# Patient Record
Sex: FEMALE | URBAN - METROPOLITAN AREA
[De-identification: names, ages, dates, MRNs, and addresses within clinical notes are randomized per-mention and may not be internally consistent; named-entity substitution may affect disease eponyms.]

---

## 2019-09-23 ENCOUNTER — APPOINTMENT (RX ONLY)
Dept: URBAN - METROPOLITAN AREA CLINIC 86 | Facility: CLINIC | Age: 64
Setting detail: DERMATOLOGY
End: 2019-09-23

## 2019-09-23 DIAGNOSIS — L82.1 OTHER SEBORRHEIC KERATOSIS: ICD-10-CM

## 2019-09-23 DIAGNOSIS — L82.0 INFLAMED SEBORRHEIC KERATOSIS: ICD-10-CM

## 2019-09-23 DIAGNOSIS — L30.4 ERYTHEMA INTERTRIGO: ICD-10-CM

## 2019-09-23 PROBLEM — M12.9 ARTHROPATHY, UNSPECIFIED: Status: ACTIVE | Noted: 2019-09-23

## 2019-09-23 PROBLEM — I10 ESSENTIAL (PRIMARY) HYPERTENSION: Status: ACTIVE | Noted: 2019-09-23

## 2019-09-23 PROCEDURE — ? LIQUID NITROGEN

## 2019-09-23 PROCEDURE — ? COUNSELING

## 2019-09-23 PROCEDURE — ? PRESCRIPTION

## 2019-09-23 PROCEDURE — 99202 OFFICE O/P NEW SF 15 MIN: CPT | Mod: 25

## 2019-09-23 PROCEDURE — ? TREATMENT REGIMEN

## 2019-09-23 PROCEDURE — ? ORDER TESTS

## 2019-09-23 RX ORDER — DESONIDE 0.5 MG/G
OINTMENT TOPICAL
Qty: 1 | Refills: 0 | Status: ERX | COMMUNITY
Start: 2019-09-23

## 2019-09-23 RX ORDER — MUPIROCIN 20 MG/G
OINTMENT TOPICAL BID
Qty: 1 | Refills: 0 | Status: ERX | COMMUNITY
Start: 2019-09-23

## 2019-09-23 RX ADMIN — DESONIDE: 0.5 OINTMENT TOPICAL at 19:30

## 2019-09-23 RX ADMIN — MUPIROCIN: 20 OINTMENT TOPICAL at 19:30

## 2019-09-23 ASSESSMENT — LOCATION DETAILED DESCRIPTION DERM
LOCATION DETAILED: PERIUMBILICAL SKIN
LOCATION DETAILED: RIGHT INFERIOR LATERAL MALAR CHEEK

## 2019-09-23 ASSESSMENT — SEVERITY ASSESSMENT: SEVERITY: MILD TO MODERATE

## 2019-09-23 ASSESSMENT — BSA RASH: BSA RASH: 2

## 2019-09-23 ASSESSMENT — LOCATION ZONE DERM
LOCATION ZONE: TRUNK
LOCATION ZONE: FACE

## 2019-09-23 ASSESSMENT — LOCATION SIMPLE DESCRIPTION DERM
LOCATION SIMPLE: ABDOMEN
LOCATION SIMPLE: RIGHT CHEEK

## 2019-09-23 ASSESSMENT — PAIN INTENSITY VAS: HOW INTENSE IS YOUR PAIN 0 BEING NO PAIN, 10 BEING THE MOST SEVERE PAIN POSSIBLE?: NO PAIN

## 2019-09-23 NOTE — PROCEDURE: MIPS QUALITY
Quality 131: Pain Assessment And Follow-Up: Pain assessment documented as positive using a standardized tool AND a follow-up plan is documented
Quality 130: Documentation Of Current Medications In The Medical Record: Current Medications Documented
Quality 402: Tobacco Use And Help With Quitting Among Adolescents: Patient screened for tobacco and is an ex-smoker
Quality 110: Preventive Care And Screening: Influenza Immunization: Influenza Immunization not Administered for Documented Reasons.
Quality 474: Zoster Vaccination Status: Shingrix Vaccination Administered or Previously Received
Detail Level: Detailed
Quality 111:Pneumonia Vaccination Status For Older Adults: Pneumococcal Vaccination Previously Received

## 2019-09-23 NOTE — HPI: SCAR
Is This A New Presentation, Or A Follow-Up?: Scar
How Severe Is Your Scar?: mild
Additional History: Wats Refill of zinc cream

## 2019-09-23 NOTE — PROCEDURE: ORDER TESTS
Performing Laboratory: [id number]
Expected Date Of Service: 09/23/2019
Bill For Surgical Tray: no
Billing Type: Third-Party Bill

## 2019-09-23 NOTE — PROCEDURE: LIQUID NITROGEN
Medical Necessity Clause: This procedure was medically necessary because the lesions that were treated were:
Number Of Freeze-Thaw Cycles: 1 freeze-thaw cycle
Render Note In Bullet Format When Appropriate: No
Post-Care Instructions: I reviewed with the patient in detail post-care instructions. Patient is to wear sunprotection, and avoid picking at any of the treated lesions. Pt may apply Vaseline to crusted or scabbing areas.
Medical Necessity Information: It is in your best interest to select a reason for this procedure from the list below. All of these items fulfill various CMS LCD requirements except the new and changing color options.
Consent: The patient's consent was obtained including but not limited to risks of crusting, scabbing, blistering, scarring, darker or lighter pigmentary change, recurrence, incomplete removal and infection.
Detail Level: Detailed
Aperture Size (Optional): D

## 2019-09-23 NOTE — PROCEDURE: TREATMENT REGIMEN
Detail Level: Zone
Plan: Discussed otc triple paste\\nAlso discussed zinc oxide will decide at follow up appointment
Initiate Treatment: Desonide ointment apply qd for 1 week\\nApply mupirocin ointment daily

## 2024-12-18 ENCOUNTER — INPATIENT (INPATIENT)
Facility: HOSPITAL | Age: 69
LOS: 6 days | Discharge: HOME | DRG: 280 | End: 2024-12-25
Attending: INTERNAL MEDICINE | Admitting: INTERNAL MEDICINE
Payer: COMMERCIAL

## 2024-12-18 VITALS
RESPIRATION RATE: 26 BRPM | DIASTOLIC BLOOD PRESSURE: 67 MMHG | HEART RATE: 134 BPM | TEMPERATURE: 97 F | SYSTOLIC BLOOD PRESSURE: 89 MMHG | WEIGHT: 199.96 LBS | OXYGEN SATURATION: 94 %

## 2024-12-18 DIAGNOSIS — E87.70 FLUID OVERLOAD, UNSPECIFIED: ICD-10-CM

## 2024-12-18 DIAGNOSIS — G25.81 RESTLESS LEGS SYNDROME: ICD-10-CM

## 2024-12-18 DIAGNOSIS — E03.9 HYPOTHYROIDISM, UNSPECIFIED: ICD-10-CM

## 2024-12-18 DIAGNOSIS — I48.91 UNSPECIFIED ATRIAL FIBRILLATION: ICD-10-CM

## 2024-12-18 LAB
ADD ON TEST-SPECIMEN IN LAB: SIGNIFICANT CHANGE UP
ALBUMIN SERPL ELPH-MCNC: 3.9 G/DL — SIGNIFICANT CHANGE UP (ref 3.3–5)
ALBUMIN SERPL ELPH-MCNC: 3.9 G/DL — SIGNIFICANT CHANGE UP (ref 3.3–5)
ALP SERPL-CCNC: 129 U/L — HIGH (ref 40–120)
ALP SERPL-CCNC: SIGNIFICANT CHANGE UP (ref 40–120)
ALT FLD-CCNC: 31 U/L — SIGNIFICANT CHANGE UP (ref 10–45)
ALT FLD-CCNC: SIGNIFICANT CHANGE UP (ref 10–45)
ANION GAP SERPL CALC-SCNC: 12 MMOL/L — SIGNIFICANT CHANGE UP (ref 5–17)
ANION GAP SERPL CALC-SCNC: 12 MMOL/L — SIGNIFICANT CHANGE UP (ref 5–17)
APTT BLD: 29.6 SEC — SIGNIFICANT CHANGE UP (ref 24.5–35.6)
AST SERPL-CCNC: 53 U/L — HIGH (ref 10–40)
AST SERPL-CCNC: SIGNIFICANT CHANGE UP (ref 10–40)
BASOPHILS # BLD AUTO: 0.04 K/UL — SIGNIFICANT CHANGE UP (ref 0–0.2)
BASOPHILS NFR BLD AUTO: 0.4 % — SIGNIFICANT CHANGE UP (ref 0–2)
BILIRUB SERPL-MCNC: 0.4 MG/DL — SIGNIFICANT CHANGE UP (ref 0.2–1.2)
BILIRUB SERPL-MCNC: 0.4 MG/DL — SIGNIFICANT CHANGE UP (ref 0.2–1.2)
BUN SERPL-MCNC: 16 MG/DL — SIGNIFICANT CHANGE UP (ref 7–23)
BUN SERPL-MCNC: 17 MG/DL — SIGNIFICANT CHANGE UP (ref 7–23)
CALCIUM SERPL-MCNC: 9 MG/DL — SIGNIFICANT CHANGE UP (ref 8.4–10.5)
CALCIUM SERPL-MCNC: 9 MG/DL — SIGNIFICANT CHANGE UP (ref 8.4–10.5)
CHLORIDE SERPL-SCNC: 102 MMOL/L — SIGNIFICANT CHANGE UP (ref 96–108)
CHLORIDE SERPL-SCNC: 103 MMOL/L — SIGNIFICANT CHANGE UP (ref 96–108)
CK MB CFR SERPL CALC: 3.3 NG/ML — SIGNIFICANT CHANGE UP (ref 0–6.7)
CK SERPL-CCNC: 95 U/L — SIGNIFICANT CHANGE UP (ref 25–170)
CO2 SERPL-SCNC: 24 MMOL/L — SIGNIFICANT CHANGE UP (ref 22–31)
CO2 SERPL-SCNC: 25 MMOL/L — SIGNIFICANT CHANGE UP (ref 22–31)
CREAT SERPL-MCNC: 0.81 MG/DL — SIGNIFICANT CHANGE UP (ref 0.5–1.3)
CREAT SERPL-MCNC: 0.88 MG/DL — SIGNIFICANT CHANGE UP (ref 0.5–1.3)
EGFR: 71 ML/MIN/1.73M2 — SIGNIFICANT CHANGE UP
EGFR: 78 ML/MIN/1.73M2 — SIGNIFICANT CHANGE UP
EOSINOPHIL # BLD AUTO: 0.2 K/UL — SIGNIFICANT CHANGE UP (ref 0–0.5)
EOSINOPHIL NFR BLD AUTO: 1.9 % — SIGNIFICANT CHANGE UP (ref 0–6)
GLUCOSE SERPL-MCNC: 105 MG/DL — HIGH (ref 70–99)
GLUCOSE SERPL-MCNC: 106 MG/DL — HIGH (ref 70–99)
HCT VFR BLD CALC: 39.1 % — SIGNIFICANT CHANGE UP (ref 34.5–45)
HGB BLD-MCNC: 12.5 G/DL — SIGNIFICANT CHANGE UP (ref 11.5–15.5)
IMM GRANULOCYTES NFR BLD AUTO: 0.2 % — SIGNIFICANT CHANGE UP (ref 0–0.9)
INR BLD: 0.96 — SIGNIFICANT CHANGE UP (ref 0.85–1.16)
LACTATE SERPL-SCNC: 1.9 MMOL/L — SIGNIFICANT CHANGE UP (ref 0.5–2)
LYMPHOCYTES # BLD AUTO: 1.66 K/UL — SIGNIFICANT CHANGE UP (ref 1–3.3)
LYMPHOCYTES # BLD AUTO: 16 % — SIGNIFICANT CHANGE UP (ref 13–44)
MAGNESIUM SERPL-MCNC: 1.8 MG/DL — SIGNIFICANT CHANGE UP (ref 1.6–2.6)
MCHC RBC-ENTMCNC: 29.5 PG — SIGNIFICANT CHANGE UP (ref 27–34)
MCHC RBC-ENTMCNC: 32 G/DL — SIGNIFICANT CHANGE UP (ref 32–36)
MCV RBC AUTO: 92.2 FL — SIGNIFICANT CHANGE UP (ref 80–100)
MONOCYTES # BLD AUTO: 0.61 K/UL — SIGNIFICANT CHANGE UP (ref 0–0.9)
MONOCYTES NFR BLD AUTO: 5.9 % — SIGNIFICANT CHANGE UP (ref 2–14)
NEUTROPHILS # BLD AUTO: 7.86 K/UL — HIGH (ref 1.8–7.4)
NEUTROPHILS NFR BLD AUTO: 75.6 % — SIGNIFICANT CHANGE UP (ref 43–77)
NRBC # BLD: 0 /100 WBCS — SIGNIFICANT CHANGE UP (ref 0–0)
NT-PROBNP SERPL-SCNC: 1614 PG/ML — HIGH (ref 0–300)
PHOSPHATE SERPL-MCNC: 3.4 MG/DL — SIGNIFICANT CHANGE UP (ref 2.5–4.5)
PLATELET # BLD AUTO: 279 K/UL — SIGNIFICANT CHANGE UP (ref 150–400)
POTASSIUM SERPL-MCNC: 4.3 MMOL/L — SIGNIFICANT CHANGE UP (ref 3.5–5.3)
POTASSIUM SERPL-MCNC: SIGNIFICANT CHANGE UP (ref 3.5–5.3)
POTASSIUM SERPL-SCNC: 4.3 MMOL/L — SIGNIFICANT CHANGE UP (ref 3.5–5.3)
POTASSIUM SERPL-SCNC: SIGNIFICANT CHANGE UP (ref 3.5–5.3)
PROT SERPL-MCNC: 6.6 G/DL — SIGNIFICANT CHANGE UP (ref 6–8.3)
PROT SERPL-MCNC: 7 G/DL — SIGNIFICANT CHANGE UP (ref 6–8.3)
PROTHROM AB SERPL-ACNC: 11.2 SEC — SIGNIFICANT CHANGE UP (ref 9.9–13.4)
RBC # BLD: 4.24 M/UL — SIGNIFICANT CHANGE UP (ref 3.8–5.2)
RBC # FLD: 14.7 % — HIGH (ref 10.3–14.5)
SODIUM SERPL-SCNC: 138 MMOL/L — SIGNIFICANT CHANGE UP (ref 135–145)
SODIUM SERPL-SCNC: 140 MMOL/L — SIGNIFICANT CHANGE UP (ref 135–145)
TROPONIN T, HIGH SENSITIVITY RESULT: 210 NG/L — CRITICAL HIGH (ref 0–51)
TROPONIN T, HIGH SENSITIVITY RESULT: 80 NG/L — CRITICAL HIGH (ref 0–51)
WBC # BLD: 10.39 K/UL — SIGNIFICANT CHANGE UP (ref 3.8–10.5)
WBC # FLD AUTO: 10.39 K/UL — SIGNIFICANT CHANGE UP (ref 3.8–10.5)

## 2024-12-18 PROCEDURE — 92960 CARDIOVERSION ELECTRIC EXT: CPT

## 2024-12-18 PROCEDURE — 71045 X-RAY EXAM CHEST 1 VIEW: CPT | Mod: 26

## 2024-12-18 PROCEDURE — 99285 EMERGENCY DEPT VISIT HI MDM: CPT

## 2024-12-18 PROCEDURE — 99223 1ST HOSP IP/OBS HIGH 75: CPT

## 2024-12-18 RX ORDER — HEPARIN SODIUM 1000 [USP'U]/ML
9500 INJECTION, SOLUTION INTRAVENOUS; SUBCUTANEOUS EVERY 6 HOURS
Refills: 0 | Status: DISCONTINUED | OUTPATIENT
Start: 2024-12-18 | End: 2024-12-19

## 2024-12-18 RX ORDER — FUROSEMIDE 20 MG
40 TABLET ORAL ONCE
Refills: 0 | Status: COMPLETED | OUTPATIENT
Start: 2024-12-18 | End: 2024-12-18

## 2024-12-18 RX ORDER — ZOLPIDEM TARTRATE 5 MG
1 TABLET ORAL
Refills: 0 | DISCHARGE

## 2024-12-18 RX ORDER — HEPARIN SODIUM 1000 [USP'U]/ML
3500 INJECTION, SOLUTION INTRAVENOUS; SUBCUTANEOUS EVERY 6 HOURS
Refills: 0 | Status: DISCONTINUED | OUTPATIENT
Start: 2024-12-18 | End: 2024-12-18

## 2024-12-18 RX ORDER — ROPINIROLE HYDROCHLORIDE 1 MG/1
2 TABLET, FILM COATED ORAL
Refills: 0 | DISCHARGE

## 2024-12-18 RX ORDER — LEVOTHYROXINE SODIUM 175 UG/1
1 TABLET ORAL
Refills: 0 | DISCHARGE

## 2024-12-18 RX ORDER — CLONAZEPAM 2 MG
1 TABLET ORAL
Refills: 0 | DISCHARGE

## 2024-12-18 RX ORDER — OXYCODONE AND ACETAMINOPHEN 5; 325 MG/1; MG/1
1 TABLET ORAL
Refills: 0 | DISCHARGE

## 2024-12-18 RX ORDER — METOPROLOL TARTRATE 50 MG
5 TABLET ORAL ONCE
Refills: 0 | Status: COMPLETED | OUTPATIENT
Start: 2024-12-18 | End: 2024-12-18

## 2024-12-18 RX ORDER — ASPIRIN 81 MG
81 TABLET, DELAYED RELEASE (ENTERIC COATED) ORAL DAILY
Refills: 0 | Status: DISCONTINUED | OUTPATIENT
Start: 2024-12-18 | End: 2024-12-25

## 2024-12-18 RX ORDER — ZOLPIDEM TARTRATE 5 MG
5 TABLET ORAL AT BEDTIME
Refills: 0 | Status: DISCONTINUED | OUTPATIENT
Start: 2024-12-18 | End: 2024-12-25

## 2024-12-18 RX ORDER — HEPARIN SODIUM 1000 [USP'U]/ML
7500 INJECTION, SOLUTION INTRAVENOUS; SUBCUTANEOUS EVERY 6 HOURS
Refills: 0 | Status: DISCONTINUED | OUTPATIENT
Start: 2024-12-18 | End: 2024-12-18

## 2024-12-18 RX ORDER — FUROSEMIDE 20 MG
80 TABLET ORAL
Refills: 0 | Status: DISCONTINUED | OUTPATIENT
Start: 2024-12-19 | End: 2024-12-22

## 2024-12-18 RX ORDER — LEVOTHYROXINE SODIUM 175 UG/1
75 TABLET ORAL DAILY
Refills: 0 | Status: DISCONTINUED | OUTPATIENT
Start: 2024-12-19 | End: 2024-12-25

## 2024-12-18 RX ORDER — HEPARIN SODIUM 1000 [USP'U]/ML
INJECTION, SOLUTION INTRAVENOUS; SUBCUTANEOUS
Qty: 25000 | Refills: 0 | Status: DISCONTINUED | OUTPATIENT
Start: 2024-12-18 | End: 2024-12-19

## 2024-12-18 RX ORDER — HEPARIN SODIUM 1000 [USP'U]/ML
4500 INJECTION, SOLUTION INTRAVENOUS; SUBCUTANEOUS EVERY 6 HOURS
Refills: 0 | Status: DISCONTINUED | OUTPATIENT
Start: 2024-12-18 | End: 2024-12-19

## 2024-12-18 RX ORDER — ROPINIROLE HYDROCHLORIDE 1 MG/1
6 TABLET, FILM COATED ORAL DAILY
Refills: 0 | Status: DISCONTINUED | OUTPATIENT
Start: 2024-12-18 | End: 2024-12-25

## 2024-12-18 RX ORDER — HEPARIN SODIUM 1000 [USP'U]/ML
INJECTION, SOLUTION INTRAVENOUS; SUBCUTANEOUS
Qty: 25000 | Refills: 0 | Status: DISCONTINUED | OUTPATIENT
Start: 2024-12-18 | End: 2024-12-18

## 2024-12-18 RX ADMIN — Medication 40 MILLIGRAM(S): at 20:34

## 2024-12-18 RX ADMIN — Medication 5 MILLIGRAM(S): at 19:44

## 2024-12-18 RX ADMIN — HEPARIN SODIUM 2100 UNIT(S)/HR: 1000 INJECTION, SOLUTION INTRAVENOUS; SUBCUTANEOUS at 23:34

## 2024-12-18 NOTE — H&P ADULT - NSICDXPASTMEDICALHX_GEN_ALL_CORE_FT
PAST MEDICAL HISTORY:  Asthma     HLD (hyperlipidemia)     HTN (hypertension)     Hypothyroidism     Restless leg     Transient ischemic attack (TIA)

## 2024-12-18 NOTE — ED PROVIDER NOTE - PHYSICAL EXAMINATION
Gen - Nontoxic, speaking sentences, alert and oriented x 4  HEENT - NCAT, EOMI, moist mucous membranes, clear oropharynx, +nasal cannula  Neck - supple, no JVD  Resp - bibasilar crackles, no increased WOB but mildly tachypneic  CV -  tachycardic, irregular, no m/r/g  Abd - elevated BMI, soft, NT, ND; no guarding or rebound  MSK - FROM of b/l UE and LE, no gross deformities  Extrem - b/l 2+ pitting edema, no erythema/warmth/tenderness  Neuro - no focal motor or sensation deficits  Skin - cool to touch in distal LEs but warm to touch centrally and in UEs

## 2024-12-18 NOTE — ED PROVIDER NOTE - PROGRESS NOTE DETAILS
[place] MD: Patient reassessed, resting comfortably, HDS. Given 5mg IV lopressor with improvement in HR to 90s. Remains on 2L NC. Will give IV lasix 40mg dose and monitor for urine output. To be admitted to [facility]. [place] MD: Patient reassessed, resting comfortably, HDS. Given 5mg IV lopressor with improvement in HR to 90s. Remains on 2L NC. Will give IV lasix 40mg dose and monitor for urine output. Initial trop somewhat elevated, likely due to demand ischemia, no active chest pain or acute ST segment changes on EKG. Patient signed out to cards fellow/PA.

## 2024-12-18 NOTE — H&P ADULT - PROBLEM SELECTOR PLAN 1
p/w TANNER 5 steps, increasing 2-3+ pitting LE edema x1mo  - BNP 1600, trop 80 -> 141 -> 210  - CXR with vascular congestion  - s/p lasix 40mg IVP x1 in ER  - start lasix 80mg IVP BID  - echo

## 2024-12-18 NOTE — H&P ADULT - ASSESSMENT
68yo obese F ++FHx CAD (mom), PMHx HTN, HLD, TIA x2 (~2019, no residual deficits), hypothyroidism, OA (b/l knee replacement), asthma, anxiety presented to [facility] ED c/o worsening TANNER and b/l LE edema x1mo. States she can only go 5 steps without losing her breath. Denies hx CHF or afib. Pt is visiting from [place] with her . Denies chest pain, palpitations, dizziness, LOC, N/V/D, fever/chills/sick contact, diaphoresis, orthopnea/PND.    In ER, -130s; BP trending 100s/60s; afebrile. Placed on 2LNC for comfort. EKG showing afib RVR rates 120s. CXR w/ vascular congestion. Labs s/f Alk Phos 129, AST 53, trop 80, BNP 1600. S/p lasix 40mg IVP x1, lopressor 5mg IVP x1 in ER. Rates improved to 90s s/p lopressor. Admitted to cardiac tele for CHF exacerbation.

## 2024-12-18 NOTE — ED PROVIDER NOTE - OBJECTIVE STATEMENT
69-year-old female with history of hypertension, hyperlipidemia, anxiety, presenting with progressive shortness of breath and bilateral lower extremity swelling x 1 month, acutely worse today. Patient and spouse are currently visiting from [facility].  States that over the past month she has had worsening SOB/TANNER in conjunction with swelling in both legs as well as episodes intermittently of dizziness.  Today while walking became acutely worse and felt like she could not breathe.  Denies chest pain, fever, chills, cough, vomiting.  No known history of congestive heart failure or A-fib.  Found to be tachycardic to 140 on arrival to ED.

## 2024-12-18 NOTE — ED ADULT NURSE NOTE - OBJECTIVE STATEMENT
Pt is a 70yo female presenting to ED c/o shortness of breath. Pt states, "I have felt short of breath for a month but today it became very bad to the point I could no longer walk." Pt noted to have nonpitting edema to BLE. Pt is A&Ox4, breathing even and unlabored speaking in clear full sentences, SpO2 92% on RA, 96% on 2L NC, ambulatory with no assistive devices at baseline, denies lightheadedness, dizziness, h/a, n/v, c/p, f/c.

## 2024-12-18 NOTE — H&P ADULT - PROBLEM SELECTOR PLAN 4
c/w home ropinirole 3mg 2tabs prn restless leg    ##HTN  - endorses taking home anti-HTN med, but uncertain name of med -- f/u with pharmacy in AM (in [place])    F: Oral intake  E: Replete electrolytes as needed for K<4 and Mg<2  N: DASH Diet  DVT PPX: heparin gtt  Dispo: medically active

## 2024-12-18 NOTE — ED PROVIDER NOTE - CLINICAL SUMMARY MEDICAL DECISION MAKING FREE TEXT BOX
69-year-old female with history of hypertension, hyperlipidemia, anxiety, presenting with progressive shortness of breath and bilateral lower extremity swelling x 1 month, acutely worse today. 69-year-old female with history of hypertension, hyperlipidemia, anxiety, presenting with progressive shortness of breath and bilateral lower extremity swelling x 1 month, acutely worse today. Suspect 2/2 to new congestive heart failure. Unclear if driven by undiagnosed new Afib over past month, or if new CHF is driving Afib rvr acutely today. Placed on 2L NC for mild borderline hypoxia to 92% and for comfort. No significant WOB/tachypnea to indicate PPV. Triage BP soft but upon repeat 100s/70s and stable. Legs are cool to touch but she is warm centrally and in b/l arms with good mentation--will send lactate level in addition to cardiac enzymes, basics, and CXR. Will need 5 uris admission for cards eval, TTE, telemonitoring, and IV diuresis.

## 2024-12-18 NOTE — H&P ADULT - HISTORY OF PRESENT ILLNESS
70yo obese F ++FHx CAD (mom), PMHx HTN, HLD, TIA x2 (~2019, no residual deficits), hypothyroidism, OA (b/l knee replacement), asthma, anxiety presented to [facility] ED c/o worsening TANNER and b/l LE edema x1mo. States she can only go 5 steps without losing her breath. Denies hx CHF or afib. Pt is visiting from [place] with her . Denies chest pain, palpitations, dizziness, LOC, N/V/D, fever/chills/sick contact, diaphoresis, orthopnea/PND.    In ER, -130s; BP trending 100s/60s; afebrile. Placed on 2LNC for comfort. EKG showing afib RVR rates 120s. CXR w/ vascular congestion. Labs s/f Alk Phos 129, AST 53, trop 80, BNP 1600. S/p lasix 40mg IVP x1, lopressor 5mg IVP x1 in ER. Rates improved to 90s s/p lopressor. Admitted to cardiac tele for CHF exacerbation.      70yo obese F ++FHx CAD (mom), PMHx HTN, HLD, TIA x2 (~2019, no residual deficits), hypothyroidism, OA (b/l knee replacement), asthma, anxiety presented to [facility] ED c/o worsening TANNER and b/l LE edema x1mo. States she can only go 5 steps without losing her breath. Denies hx CHF or afib. Pt is visiting from [place] with her . Denies chest pain, palpitations, dizziness, LOC, N/V/D, fever/chills/sick contact, diaphoresis, orthopnea/PND.    In ER, -130s; BP trending 100s/60s; afebrile. Placed on 2LNC for comfort. EKG showing afib RVR rates 120s. CXR w/ vascular congestion. Labs s/f Alk Phos 129, AST 53, trop 80, BNP 1600. S/p lasix 40mg IVP x1, lopressor 5mg IVP x1 in ER. Rates improved to 90s s/p lopressor. Admitted to cardiac tele for CHF exacerbation.

## 2024-12-18 NOTE — H&P ADULT - NSICDXFAMILYHX_GEN_ALL_CORE_FT
FAMILY HISTORY:  Father  Still living? Unknown  FHx: stroke, Age at diagnosis: Age Unknown    Mother  Still living? No  FH: CAD (coronary artery disease), Age at diagnosis: Age Unknown

## 2024-12-18 NOTE — ED ADULT NURSE NOTE - NSFALLRISKINTERV_ED_ALL_ED
Assistance OOB with selected safe patient handling equipment if applicable/Assistance with ambulation/Communicate fall risk and risk factors to all staff, patient, and family/Monitor gait and stability/Provide visual cue: yellow wristband, yellow gown, etc/Reinforce activity limits and safety measures with patient and family/Call bell, personal items and telephone in reach/Instruct patient to call for assistance before getting out of bed/chair/stretcher/Non-slip footwear applied when patient is off stretcher/[place] to call system/Physically safe environment - no spills, clutter or unnecessary equipment/Purposeful Proactive Rounding/Room/bathroom lighting operational, light cord in reach

## 2024-12-18 NOTE — ED ADULT TRIAGE NOTE - CHIEF COMPLAINT QUOTE
Patient PMH HTN to the ED c/o sob/brunner, ble edema and generalized weakness x 1 month, worsening today. Tachypneic, tachycardic and hypotensive, denies hx of afib or vte. AAOX4, NAD.

## 2024-12-18 NOTE — H&P ADULT - NSHPLABSRESULTS_GEN_ALL_CORE
12.5   10.39 )-----------( 279      ( 18 Dec 2024 18:59 )             39.1       12-18    140  |  103  |  17  ----------------------------<  105[H]  4.3   |  25  |  0.88    Ca    9.0      18 Dec 2024 19:57  Phos  3.4     12-18  Mg     1.8     12-18    TPro  6.6  /  Alb  3.9  /  TBili  0.4  /  DBili  x   /  AST  53[H]  /  ALT  31  /  AlkPhos  129[H]  12-18      PT/INR - ( 18 Dec 2024 21:25 )   PT: 11.2 sec;   INR: 0.96          PTT - ( 18 Dec 2024 21:25 )  PTT:29.6 sec    CARDIAC MARKERS ( 18 Dec 2024 21:25 )  x     / x     / x     / x     / 3.3 ng/mL        Urinalysis Basic - ( 18 Dec 2024 19:57 )    Color: x / Appearance: x / SG: x / pH: x  Gluc: 105 mg/dL / Ketone: x  / Bili: x / Urobili: x   Blood: x / Protein: x / Nitrite: x   Leuk Esterase: x / RBC: x / WBC x   Sq Epi: x / Non Sq Epi: x / Bacteria: x

## 2024-12-18 NOTE — H&P ADULT - NS ATTEND AMEND GEN_ALL_CORE FT
68yo obese F ++FHx CAD (mom), PMHx HTN, HLD, TIA x2 (~2019, no residual deficits), hypothyroidism, OA (b/l knee replacement), asthma, anxiety presented to [facility] ED c/o worsening TANNER and b/l LE edema x1mo. States she can only go 5 steps without losing her breath. Denies hx CHF or afib. Pt is visiting from [place] with her .    1. AF RVR  New onset AF with RVR. Start anticoagulation, BB, echo in AM, if does not convert, SHERINE/DCCV once euvolemic.    2. Acute HF  Clinical presentation consistent with HF. Lasix IV, echo in AM.    During non face-to-face time, I reviewed relevant portions of the patient’s medical record; including vitals, labs, medications, cardiac studies, remaining additional imaging and consultant recommendations. During face-to-face time, I took a relevant history and examined the patient. I also explained to the patient their diagnoses, and specific cardiopulmonary management plan, which required a high level of medical decision making.  I answered all questions related to the patient's medical conditions.

## 2024-12-18 NOTE — H&P ADULT - PROBLEM SELECTOR PLAN 2
p/w afib rates 120-130s  - s/p lopressor 5mg IVP x1 in ER w/ rates respond to 100s  - holding further BB given CHF exacerbation  - start heparin gtt

## 2024-12-19 DIAGNOSIS — G25.81 RESTLESS LEGS SYNDROME: ICD-10-CM

## 2024-12-19 DIAGNOSIS — E03.9 HYPOTHYROIDISM, UNSPECIFIED: ICD-10-CM

## 2024-12-19 DIAGNOSIS — I50.21 ACUTE SYSTOLIC (CONGESTIVE) HEART FAILURE: ICD-10-CM

## 2024-12-19 DIAGNOSIS — I48.91 UNSPECIFIED ATRIAL FIBRILLATION: ICD-10-CM

## 2024-12-19 LAB
A1C WITH ESTIMATED AVERAGE GLUCOSE RESULT: 5.8 % — HIGH (ref 4–5.6)
ALBUMIN SERPL ELPH-MCNC: 3.7 G/DL — SIGNIFICANT CHANGE UP (ref 3.3–5)
ALP SERPL-CCNC: 129 U/L — HIGH (ref 40–120)
ALT FLD-CCNC: 25 U/L — SIGNIFICANT CHANGE UP (ref 10–45)
ANION GAP SERPL CALC-SCNC: 13 MMOL/L — SIGNIFICANT CHANGE UP (ref 5–17)
APTT BLD: 123.7 SEC — CRITICAL HIGH (ref 24.5–35.6)
APTT BLD: >200 SEC — CRITICAL HIGH (ref 24.5–35.6)
APTT BLD: >200 SEC — CRITICAL HIGH (ref 24.5–35.6)
AST SERPL-CCNC: 36 U/L — SIGNIFICANT CHANGE UP (ref 10–40)
BASOPHILS # BLD AUTO: 0.03 K/UL — SIGNIFICANT CHANGE UP (ref 0–0.2)
BASOPHILS NFR BLD AUTO: 0.4 % — SIGNIFICANT CHANGE UP (ref 0–2)
BILIRUB SERPL-MCNC: 0.6 MG/DL — SIGNIFICANT CHANGE UP (ref 0.2–1.2)
BUN SERPL-MCNC: 21 MG/DL — SIGNIFICANT CHANGE UP (ref 7–23)
CALCIUM SERPL-MCNC: 9.2 MG/DL — SIGNIFICANT CHANGE UP (ref 8.4–10.5)
CHLORIDE SERPL-SCNC: 102 MMOL/L — SIGNIFICANT CHANGE UP (ref 96–108)
CHOLEST SERPL-MCNC: 140 MG/DL — SIGNIFICANT CHANGE UP
CK MB CFR SERPL CALC: 3.6 NG/ML — SIGNIFICANT CHANGE UP (ref 0–6.7)
CK MB CFR SERPL CALC: 3.8 NG/ML — SIGNIFICANT CHANGE UP (ref 0–6.7)
CK SERPL-CCNC: 86 U/L — SIGNIFICANT CHANGE UP (ref 25–170)
CK SERPL-CCNC: 88 U/L — SIGNIFICANT CHANGE UP (ref 25–170)
CO2 SERPL-SCNC: 26 MMOL/L — SIGNIFICANT CHANGE UP (ref 22–31)
CREAT SERPL-MCNC: 1 MG/DL — SIGNIFICANT CHANGE UP (ref 0.5–1.3)
EGFR: 61 ML/MIN/1.73M2 — SIGNIFICANT CHANGE UP
EOSINOPHIL # BLD AUTO: 0.15 K/UL — SIGNIFICANT CHANGE UP (ref 0–0.5)
EOSINOPHIL NFR BLD AUTO: 2 % — SIGNIFICANT CHANGE UP (ref 0–6)
ESTIMATED AVERAGE GLUCOSE: 120 MG/DL — HIGH (ref 68–114)
GLUCOSE SERPL-MCNC: 109 MG/DL — HIGH (ref 70–99)
HCT VFR BLD CALC: 39.6 % — SIGNIFICANT CHANGE UP (ref 34.5–45)
HDLC SERPL-MCNC: 67 MG/DL — SIGNIFICANT CHANGE UP
HGB BLD-MCNC: 12.7 G/DL — SIGNIFICANT CHANGE UP (ref 11.5–15.5)
IMM GRANULOCYTES NFR BLD AUTO: 0.4 % — SIGNIFICANT CHANGE UP (ref 0–0.9)
INR BLD: 1.13 — SIGNIFICANT CHANGE UP (ref 0.85–1.16)
LIPID PNL WITH DIRECT LDL SERPL: 59 MG/DL — SIGNIFICANT CHANGE UP
LYMPHOCYTES # BLD AUTO: 2.6 K/UL — SIGNIFICANT CHANGE UP (ref 1–3.3)
LYMPHOCYTES # BLD AUTO: 34.3 % — SIGNIFICANT CHANGE UP (ref 13–44)
MAGNESIUM SERPL-MCNC: 1.7 MG/DL — SIGNIFICANT CHANGE UP (ref 1.6–2.6)
MCHC RBC-ENTMCNC: 29.8 PG — SIGNIFICANT CHANGE UP (ref 27–34)
MCHC RBC-ENTMCNC: 32.1 G/DL — SIGNIFICANT CHANGE UP (ref 32–36)
MCV RBC AUTO: 93 FL — SIGNIFICANT CHANGE UP (ref 80–100)
MONOCYTES # BLD AUTO: 0.49 K/UL — SIGNIFICANT CHANGE UP (ref 0–0.9)
MONOCYTES NFR BLD AUTO: 6.5 % — SIGNIFICANT CHANGE UP (ref 2–14)
NEUTROPHILS # BLD AUTO: 4.28 K/UL — SIGNIFICANT CHANGE UP (ref 1.8–7.4)
NEUTROPHILS NFR BLD AUTO: 56.4 % — SIGNIFICANT CHANGE UP (ref 43–77)
NON HDL CHOLESTEROL: 73 MG/DL — SIGNIFICANT CHANGE UP
NRBC # BLD: 0 /100 WBCS — SIGNIFICANT CHANGE UP (ref 0–0)
PLATELET # BLD AUTO: 246 K/UL — SIGNIFICANT CHANGE UP (ref 150–400)
POTASSIUM SERPL-MCNC: 3.8 MMOL/L — SIGNIFICANT CHANGE UP (ref 3.5–5.3)
POTASSIUM SERPL-SCNC: 3.8 MMOL/L — SIGNIFICANT CHANGE UP (ref 3.5–5.3)
PROT SERPL-MCNC: 6.6 G/DL — SIGNIFICANT CHANGE UP (ref 6–8.3)
PROTHROM AB SERPL-ACNC: 13.2 SEC — SIGNIFICANT CHANGE UP (ref 9.9–13.4)
RBC # BLD: 4.26 M/UL — SIGNIFICANT CHANGE UP (ref 3.8–5.2)
RBC # FLD: 15 % — HIGH (ref 10.3–14.5)
SODIUM SERPL-SCNC: 141 MMOL/L — SIGNIFICANT CHANGE UP (ref 135–145)
TRIGL SERPL-MCNC: 68 MG/DL — SIGNIFICANT CHANGE UP
TROPONIN T, HIGH SENSITIVITY RESULT: 143 NG/L — CRITICAL HIGH (ref 0–51)
TROPONIN T, HIGH SENSITIVITY RESULT: 212 NG/L — CRITICAL HIGH (ref 0–51)
TSH SERPL-MCNC: 5.24 UIU/ML — HIGH (ref 0.27–4.2)
WBC # BLD: 7.58 K/UL — SIGNIFICANT CHANGE UP (ref 3.8–10.5)
WBC # FLD AUTO: 7.58 K/UL — SIGNIFICANT CHANGE UP (ref 3.8–10.5)

## 2024-12-19 PROCEDURE — 99233 SBSQ HOSP IP/OBS HIGH 50: CPT

## 2024-12-19 PROCEDURE — 93306 TTE W/DOPPLER COMPLETE: CPT | Mod: 26

## 2024-12-19 RX ORDER — APIXABAN 5 MG/1
5 TABLET, FILM COATED ORAL EVERY 12 HOURS
Refills: 0 | Status: DISCONTINUED | OUTPATIENT
Start: 2024-12-19 | End: 2024-12-23

## 2024-12-19 RX ORDER — SPIRONOLACTONE 50 MG/1
25 TABLET ORAL DAILY
Refills: 0 | Status: DISCONTINUED | OUTPATIENT
Start: 2024-12-19 | End: 2024-12-20

## 2024-12-19 RX ORDER — MAGNESIUM SULFATE 500 MG/ML
1 INJECTION, SOLUTION INTRAMUSCULAR; INTRAVENOUS ONCE
Refills: 0 | Status: COMPLETED | OUTPATIENT
Start: 2024-12-19 | End: 2024-12-19

## 2024-12-19 RX ORDER — METOPROLOL TARTRATE 50 MG
25 TABLET ORAL
Refills: 0 | Status: DISCONTINUED | OUTPATIENT
Start: 2024-12-19 | End: 2024-12-22

## 2024-12-19 RX ORDER — LIDOCAINE 50 MG/G
2 OINTMENT TOPICAL EVERY 24 HOURS
Refills: 0 | Status: DISCONTINUED | OUTPATIENT
Start: 2024-12-19 | End: 2024-12-25

## 2024-12-19 RX ORDER — PANTOPRAZOLE 40 MG/1
40 TABLET, DELAYED RELEASE ORAL
Refills: 0 | Status: DISCONTINUED | OUTPATIENT
Start: 2024-12-19 | End: 2024-12-25

## 2024-12-19 RX ORDER — APIXABAN 5 MG/1
1 TABLET, FILM COATED ORAL
Qty: 60 | Refills: 0
Start: 2024-12-19 | End: 2025-01-17

## 2024-12-19 RX ADMIN — APIXABAN 5 MILLIGRAM(S): 5 TABLET, FILM COATED ORAL at 19:44

## 2024-12-19 RX ADMIN — ROPINIROLE HYDROCHLORIDE 6 MILLIGRAM(S): 1 TABLET, FILM COATED ORAL at 04:12

## 2024-12-19 RX ADMIN — MAGNESIUM SULFATE 100 GRAM(S): 500 INJECTION, SOLUTION INTRAMUSCULAR; INTRAVENOUS at 19:45

## 2024-12-19 RX ADMIN — Medication 25 MILLIGRAM(S): at 19:44

## 2024-12-19 RX ADMIN — LEVOTHYROXINE SODIUM 75 MICROGRAM(S): 175 TABLET ORAL at 05:19

## 2024-12-19 RX ADMIN — Medication 80 MILLIGRAM(S): at 10:40

## 2024-12-19 RX ADMIN — Medication 81 MILLIGRAM(S): at 12:15

## 2024-12-19 RX ADMIN — Medication 40 MILLIGRAM(S): at 00:06

## 2024-12-19 RX ADMIN — LIDOCAINE 2 PATCH: 50 OINTMENT TOPICAL at 19:46

## 2024-12-19 RX ADMIN — Medication 80 MILLIGRAM(S): at 16:37

## 2024-12-19 NOTE — PROGRESS NOTE ADULT - SUBJECTIVE AND OBJECTIVE BOX
Interventional Cardiology PA Adult Progress Note    Subjective Assessment: Pt seen and examined at bedside this morning. Reports improvement in her shortness of breath compared to yesterday, though still feels winded easily. Reports improvement in her lower extremity swelling. Denies CP, palpitations, N/V.      ROS Negative except as per Subjective and HPI  	  MEDICATIONS:  furosemide   Injectable 80 milliGRAM(s) IV Push <User Schedule>  metoprolol tartrate 25 milliGRAM(s) Oral two times a day  spironolactone 25 milliGRAM(s) Oral daily  rOPINIRole 6 milliGRAM(s) Oral daily PRN  zolpidem 5 milliGRAM(s) Oral at bedtime PRN  levothyroxine 75 MICROGram(s) Oral daily  apixaban 5 milliGRAM(s) Oral every 12 hours  aspirin enteric coated 81 milliGRAM(s) Oral daily  lidocaine   4% Patch 2 Patch Transdermal every 24 hours    [PHYSICAL EXAM:  TELEMETRY:  T(C): 36.9 (12-19-24 @ 13:27), Max: 36.9 (12-19-24 @ 13:27)  HR: 106 (12-19-24 @ 16:00) (93 - 134)  BP: 126/86 (12-19-24 @ 16:00) (89/67 - 139/86)  RR: 18 (12-19-24 @ 16:00) (16 - 26)  SpO2: 95% (12-19-24 @ 16:00) (93% - 97%)  Wt(kg): --  I&O's Summary    18 Dec 2024 07:01  -  19 Dec 2024 07:00  --------------------------------------------------------  IN: 309 mL / OUT: 1500 mL / NET: -1191 mL    19 Dec 2024 07:01  -  19 Dec 2024 17:58  --------------------------------------------------------  IN: 519 mL / OUT: 3050 mL / NET: -2531 mL        Weight (kg): 116.3 (12-18 @ 22:51)  [place]:  Central/PICC/Mid Line:                                         Appearance: Normal	  HEENT: Normal oral mucosa, PERRL, EOMI	  Neck: Supple, JVD difficult to assess due to body habitus  Cardiovascular: Normal S1 S2, No murmurs, irregular rhythm  Respiratory: Lungs clear to auscultation/Decreased Breath Sounds/No Rales, Rhonchi, Wheezing	  Gastrointestinal: Soft, Non-tender	  Extremities: Normal range of motion, 2+ edema to lower extremities b/l  Vascular: Peripheral pulses palpable 2+ bilaterally  Neurologic: Non-focal  Psychiatry: A & O x 3, Mood & affect appropriate    LABS:	 	  CARDIAC MARKERS:                        12.7   7.58  )-----------( 246      ( 19 Dec 2024 05:30 )             39.6     12-19    141  |  102  |  21  ----------------------------<  109[H]  3.8   |  26  |  1.00    Ca    9.2      19 Dec 2024 05:30  Phos  3.4     12-18  Mg     1.7     12-19    TPro  6.6  /  Alb  3.7  /  TBili  0.6  /  DBili  x   /  AST  36  /  ALT  25  /  AlkPhos  129[H]  12-19    TSH: Thyroid Stimulating Hormone, Serum: 5.240 uIU/mL (12-19 @ 05:30)    PT/INR - ( 19 Dec 2024 05:30 )   PT: 13.2 sec;   INR: 1.13          PTT - ( 19 Dec 2024 10:13 )  PTT:123.7 sec   Interventional Cardiology PA Adult Progress Note    Subjective Assessment: Pt seen and examined at bedside this morning. Reports improvement in her shortness of breath compared to yesterday, though still feels winded easily. Reports improvement in her lower extremity swelling. Denies CP, palpitations, N/V.      ROS Negative except as per Subjective and HPI  	  MEDICATIONS:  furosemide   Injectable 80 milliGRAM(s) IV Push <User Schedule>  metoprolol tartrate 25 milliGRAM(s) Oral two times a day  spironolactone 25 milliGRAM(s) Oral daily  rOPINIRole 6 milliGRAM(s) Oral daily PRN  zolpidem 5 milliGRAM(s) Oral at bedtime PRN  levothyroxine 75 MICROGram(s) Oral daily  apixaban 5 milliGRAM(s) Oral every 12 hours  aspirin enteric coated 81 milliGRAM(s) Oral daily  lidocaine   4% Patch 2 Patch Transdermal every 24 hours    [PHYSICAL EXAM:  TELEMETRY:  T(C): 36.9 (12-19-24 @ 13:27), Max: 36.9 (12-19-24 @ 13:27)  HR: 106 (12-19-24 @ 16:00) (93 - 134)  BP: 126/86 (12-19-24 @ 16:00) (89/67 - 139/86)  RR: 18 (12-19-24 @ 16:00) (16 - 26)  SpO2: 95% (12-19-24 @ 16:00) (93% - 97%)  Wt(kg): --  I&O's Summary    18 Dec 2024 07:01  -  19 Dec 2024 07:00  --------------------------------------------------------  IN: 309 mL / OUT: 1500 mL / NET: -1191 mL    19 Dec 2024 07:01  -  19 Dec 2024 17:58  --------------------------------------------------------  IN: 519 mL / OUT: 3050 mL / NET: -2531 mL        Weight (kg): 116.3 (12-18 @ 22:51)  [place]:  Central/PICC/Mid Line:                                         Appearance: Normal	  HEENT: Normal oral mucosa, PERRL, EOMI	  Neck: Supple, JVD difficult to assess due to body habitus  Cardiovascular: Normal S1 S2, No murmurs, irregular rhythm  Respiratory: Lungs clear to auscultation/Decreased Breath Sounds/No Rales, Rhonchi, Wheezing	  Gastrointestinal: Soft, Non-tender	  Extremities: Normal range of motion, 2+ edema to lower extremities b/l (of note: L ankle more swollen than R from prior injury)  Vascular: Radial pulses 2+ b/l, DP and PT 1+ b/l  Neurologic: Non-focal  Psychiatry: A & O x 3, Mood & affect appropriate    LABS:	 	  CARDIAC MARKERS:                        12.7   7.58  )-----------( 246      ( 19 Dec 2024 05:30 )             39.6     12-19    141  |  102  |  21  ----------------------------<  109[H]  3.8   |  26  |  1.00    Ca    9.2      19 Dec 2024 05:30  Phos  3.4     12-18  Mg     1.7     12-19    TPro  6.6  /  Alb  3.7  /  TBili  0.6  /  DBili  x   /  AST  36  /  ALT  25  /  AlkPhos  129[H]  12-19    TSH: Thyroid Stimulating Hormone, Serum: 5.240 uIU/mL (12-19 @ 05:30)    PT/INR - ( 19 Dec 2024 05:30 )   PT: 13.2 sec;   INR: 1.13          PTT - ( 19 Dec 2024 10:13 )  PTT:123.7 sec   Interventional Cardiology PA Adult Progress Note    Subjective Assessment: Pt seen and examined at bedside this morning. Reports improvement in her shortness of breath compared to yesterday, though still feels winded easily. Reports improvement in her lower extremity swelling. Denies CP, palpitations, N/V.      ROS Negative except as per Subjective and HPI  	  MEDICATIONS:  furosemide   Injectable 80 milliGRAM(s) IV Push <User Schedule>  metoprolol tartrate 25 milliGRAM(s) Oral two times a day  spironolactone 25 milliGRAM(s) Oral daily  rOPINIRole 6 milliGRAM(s) Oral daily PRN  zolpidem 5 milliGRAM(s) Oral at bedtime PRN  levothyroxine 75 MICROGram(s) Oral daily  apixaban 5 milliGRAM(s) Oral every 12 hours  aspirin enteric coated 81 milliGRAM(s) Oral daily  lidocaine   4% Patch 2 Patch Transdermal every 24 hours    [PHYSICAL EXAM:  TELEMETRY:  T(C): 36.9 (12-19-24 @ 13:27), Max: 36.9 (12-19-24 @ 13:27)  HR: 106 (12-19-24 @ 16:00) (93 - 134)  BP: 126/86 (12-19-24 @ 16:00) (89/67 - 139/86)  RR: 18 (12-19-24 @ 16:00) (16 - 26)  SpO2: 95% (12-19-24 @ 16:00) (93% - 97%)  Wt(kg): --  I&O's Summary    18 Dec 2024 07:01  -  19 Dec 2024 07:00  --------------------------------------------------------  IN: 309 mL / OUT: 1500 mL / NET: -1191 mL    19 Dec 2024 07:01  -  19 Dec 2024 17:58  --------------------------------------------------------  IN: 519 mL / OUT: 3050 mL / NET: -2531 mL        Weight (kg): 116.3 (12-18 @ 22:51)  [place]:  Central/PICC/Mid Line:                                         Appearance: Normal	  HEENT: Normal oral mucosa, PERRL, EOMI	  Neck: Supple, JVD difficult to assess due to body habitus  Cardiovascular: Normal S1 S2, No murmurs, irregular rhythm  Respiratory: Poor inspiratory effort	  Gastrointestinal: Soft, Non-tender	  Extremities: Normal range of motion, 2+ edema to lower extremities b/l (of note: L ankle more swollen than R from prior injury)  Vascular: Radial pulses 2+ b/l, DP and PT 1+ b/l  Neurologic: Non-focal  Psychiatry: A & O x 3, Mood & affect appropriate    LABS:	 	  CARDIAC MARKERS:                        12.7   7.58  )-----------( 246      ( 19 Dec 2024 05:30 )             39.6     12-19    141  |  102  |  21  ----------------------------<  109[H]  3.8   |  26  |  1.00    Ca    9.2      19 Dec 2024 05:30  Phos  3.4     12-18  Mg     1.7     12-19    TPro  6.6  /  Alb  3.7  /  TBili  0.6  /  DBili  x   /  AST  36  /  ALT  25  /  AlkPhos  129[H]  12-19    TSH: Thyroid Stimulating Hormone, Serum: 5.240 uIU/mL (12-19 @ 05:30)    PT/INR - ( 19 Dec 2024 05:30 )   PT: 13.2 sec;   INR: 1.13          PTT - ( 19 Dec 2024 10:13 )  PTT:123.7 sec

## 2024-12-19 NOTE — PROGRESS NOTE ADULT - PROBLEM SELECTOR PLAN 4
- C/w home Ropinirole 3mg 2 tabs PRN    F: None  E: Replete if K<4 or Mag<2  N: DASH Diet  VTEppx: Eliquis  Dispo: medically active - C/w home Ropinirole 3mg 2 tabs PRN    F: None  E: Replete if K<4 or Mag<2  N: DASH Diet  VTEppx: Eliquis  GIppx: Protonix  Dispo: medically active

## 2024-12-19 NOTE — PROGRESS NOTE ADULT - PROBLEM SELECTOR PLAN 1
Warm and wet, BNP 1600, Trop 80->141->212->143, 95% on room air  - CXR  - TTE  - S/p Lasix 40mg IVP x1 in ER, continue Lasix 80mg IVP BID  - GDMT: start Lopressor 25mg BID, Aldactone 25mg QD Warm and wet, BNP 1600, Trop 80->141->212->143, 95% on room air  - CXR (12/18/24): Possible small left pleural effusion. No consolidation or pneumothorax.   - TTE (12/19/24): LVEF 23%, Regional wall motion abnormalities c/w ischemic heart disease. mod TR, PASP 37mmHg.  - S/p Lasix 40mg IVP x1 in ER  - Net negative 2.5L today (12/19)  - GDMT: start Lopressor 25mg BID, Aldactone 25mg QD  - Diuretics: Lasix 80mg IVP BID Warm and wet, BNP 1600, Trop 80->141->212->143, 95% on room air  - CXR (12/18/24): Possible small left pleural effusion. No consolidation or pneumothorax.   - TTE (12/19/24): LVEF 23%, Regional wall motion abnormalities c/w ischemic heart disease. mod TR, PASP 37mmHg.  - S/p Lasix 40mg IVP x1 in ER  - Net negative 2.5L today (12/19)  - Etiology: TTE c/w Takotsubo per Dr. New  - GDMT: start Lopressor 25mg BID, Aldactone 25mg QD  - Diuretics: Lasix 80mg IVP BID Warm and wet, BNP 1600, Trop 80->141->212->143, 95% on room air  - Etiology: TTE c/w Takotsubo per Dr. New  - CXR (12/18/24): Possible small left pleural effusion. No consolidation or pneumothorax.   - TTE (12/19/24): LVEF 23%, RWMA c/w ischemic heart disease (per echo read). mod TR, PASP 37mmHg.  - Net negative 2.5L today (12/19)  - GDMT: start Lopressor 25mg BID, Aldactone 25mg QD  - Diuretics: Lasix 80mg IVP BID

## 2024-12-19 NOTE — PROVIDER CONTACT NOTE (CRITICAL VALUE NOTIFICATION) - SITUATION
troponin 141; repeat 210
pt 68 y/o F admitted for chf exacerbation. pmh htn, hld, restless leg on hep gtt for new afib.

## 2024-12-19 NOTE — PROGRESS NOTE ADULT - ASSESSMENT
68YO obese F w/ PMHx HTN, HLD, TIA x2 (~2019, no residual deficits), FHx CAD (mom), hypothyroidism, OA (b/l knee replacement), asthma, anxiety. Admitted to cardiac tele for CHF exacerbation. Optimizing on GDMT and undergoing IV diuresis.  68YO obese F w/ PMHx HTN, HLD, TIA x2 (~2019, no residual deficits), FHx CAD (mom), hypothyroidism, OA (b/l knee replacement), asthma, anxiety. Reports dyspnea on exertion with decreased exercise tolerance. Admitted to cardiac tele for CHF exacerbation and new onset afib w RVR. Optimizing on GDMT and undergoing IV diuresis.

## 2024-12-19 NOTE — PROGRESS NOTE ADULT - PROBLEM SELECTOR PLAN 2
Presented w/ new onset afib rates 120s-130s, now in 100s  - S/p Lopressor 5mg IVP in ER  - S/p heparin gtt  - Rate Control: Lopressor 25mg BID  - AC: Eliquis 5mg BID Presented w/ new onset afib rates now in 100s  - Rate Control: Lopressor 25mg BID  - AC: transitioned from Heparin gtt to Eliquis 5mg BID

## 2024-12-20 LAB
ALBUMIN SERPL ELPH-MCNC: 3.8 G/DL — SIGNIFICANT CHANGE UP (ref 3.3–5)
ALP SERPL-CCNC: 141 U/L — HIGH (ref 40–120)
ALT FLD-CCNC: 24 U/L — SIGNIFICANT CHANGE UP (ref 10–45)
ANION GAP SERPL CALC-SCNC: 12 MMOL/L — SIGNIFICANT CHANGE UP (ref 5–17)
ANION GAP SERPL CALC-SCNC: 12 MMOL/L — SIGNIFICANT CHANGE UP (ref 5–17)
ANION GAP SERPL CALC-SCNC: 13 MMOL/L — SIGNIFICANT CHANGE UP (ref 5–17)
AST SERPL-CCNC: 29 U/L — SIGNIFICANT CHANGE UP (ref 10–40)
BILIRUB SERPL-MCNC: 0.6 MG/DL — SIGNIFICANT CHANGE UP (ref 0.2–1.2)
BUN SERPL-MCNC: 17 MG/DL — SIGNIFICANT CHANGE UP (ref 7–23)
BUN SERPL-MCNC: 23 MG/DL — SIGNIFICANT CHANGE UP (ref 7–23)
BUN SERPL-MCNC: 24 MG/DL — HIGH (ref 7–23)
CALCIUM SERPL-MCNC: 9.4 MG/DL — SIGNIFICANT CHANGE UP (ref 8.4–10.5)
CALCIUM SERPL-MCNC: 9.4 MG/DL — SIGNIFICANT CHANGE UP (ref 8.4–10.5)
CALCIUM SERPL-MCNC: 9.5 MG/DL — SIGNIFICANT CHANGE UP (ref 8.4–10.5)
CHLORIDE SERPL-SCNC: 94 MMOL/L — LOW (ref 96–108)
CHLORIDE SERPL-SCNC: 95 MMOL/L — LOW (ref 96–108)
CHLORIDE SERPL-SCNC: 96 MMOL/L — SIGNIFICANT CHANGE UP (ref 96–108)
CO2 SERPL-SCNC: 31 MMOL/L — SIGNIFICANT CHANGE UP (ref 22–31)
CO2 SERPL-SCNC: 31 MMOL/L — SIGNIFICANT CHANGE UP (ref 22–31)
CO2 SERPL-SCNC: 33 MMOL/L — HIGH (ref 22–31)
CREAT SERPL-MCNC: 1.05 MG/DL — SIGNIFICANT CHANGE UP (ref 0.5–1.3)
CREAT SERPL-MCNC: 1.11 MG/DL — SIGNIFICANT CHANGE UP (ref 0.5–1.3)
CREAT SERPL-MCNC: 1.15 MG/DL — SIGNIFICANT CHANGE UP (ref 0.5–1.3)
EGFR: 51 ML/MIN/1.73M2 — LOW
EGFR: 53 ML/MIN/1.73M2 — LOW
EGFR: 57 ML/MIN/1.73M2 — LOW
GLUCOSE SERPL-MCNC: 108 MG/DL — HIGH (ref 70–99)
GLUCOSE SERPL-MCNC: 112 MG/DL — HIGH (ref 70–99)
GLUCOSE SERPL-MCNC: 139 MG/DL — HIGH (ref 70–99)
HCT VFR BLD CALC: 44.1 % — SIGNIFICANT CHANGE UP (ref 34.5–45)
HGB BLD-MCNC: 13.9 G/DL — SIGNIFICANT CHANGE UP (ref 11.5–15.5)
MAGNESIUM SERPL-MCNC: 1.8 MG/DL — SIGNIFICANT CHANGE UP (ref 1.6–2.6)
MAGNESIUM SERPL-MCNC: 1.8 MG/DL — SIGNIFICANT CHANGE UP (ref 1.6–2.6)
MAGNESIUM SERPL-MCNC: 2 MG/DL — SIGNIFICANT CHANGE UP (ref 1.6–2.6)
MCHC RBC-ENTMCNC: 28.9 PG — SIGNIFICANT CHANGE UP (ref 27–34)
MCHC RBC-ENTMCNC: 31.5 G/DL — LOW (ref 32–36)
MCV RBC AUTO: 91.7 FL — SIGNIFICANT CHANGE UP (ref 80–100)
NRBC # BLD: 0 /100 WBCS — SIGNIFICANT CHANGE UP (ref 0–0)
PLATELET # BLD AUTO: 326 K/UL — SIGNIFICANT CHANGE UP (ref 150–400)
POTASSIUM SERPL-MCNC: 3.1 MMOL/L — LOW (ref 3.5–5.3)
POTASSIUM SERPL-MCNC: 3.7 MMOL/L — SIGNIFICANT CHANGE UP (ref 3.5–5.3)
POTASSIUM SERPL-MCNC: 4.2 MMOL/L — SIGNIFICANT CHANGE UP (ref 3.5–5.3)
POTASSIUM SERPL-SCNC: 3.1 MMOL/L — LOW (ref 3.5–5.3)
POTASSIUM SERPL-SCNC: 3.7 MMOL/L — SIGNIFICANT CHANGE UP (ref 3.5–5.3)
POTASSIUM SERPL-SCNC: 4.2 MMOL/L — SIGNIFICANT CHANGE UP (ref 3.5–5.3)
PROT SERPL-MCNC: 7.1 G/DL — SIGNIFICANT CHANGE UP (ref 6–8.3)
RBC # BLD: 4.81 M/UL — SIGNIFICANT CHANGE UP (ref 3.8–5.2)
RBC # FLD: 14.6 % — HIGH (ref 10.3–14.5)
SODIUM SERPL-SCNC: 139 MMOL/L — SIGNIFICANT CHANGE UP (ref 135–145)
WBC # BLD: 8.48 K/UL — SIGNIFICANT CHANGE UP (ref 3.8–10.5)
WBC # FLD AUTO: 8.48 K/UL — SIGNIFICANT CHANGE UP (ref 3.8–10.5)

## 2024-12-20 PROCEDURE — 99233 SBSQ HOSP IP/OBS HIGH 50: CPT

## 2024-12-20 RX ORDER — SPIRONOLACTONE 50 MG/1
25 TABLET ORAL
Refills: 0 | Status: DISCONTINUED | OUTPATIENT
Start: 2024-12-20 | End: 2024-12-23

## 2024-12-20 RX ORDER — POTASSIUM CHLORIDE 600 MG/1
40 TABLET, FILM COATED, EXTENDED RELEASE ORAL EVERY 4 HOURS
Refills: 0 | Status: COMPLETED | OUTPATIENT
Start: 2024-12-20 | End: 2024-12-20

## 2024-12-20 RX ADMIN — SPIRONOLACTONE 25 MILLIGRAM(S): 50 TABLET ORAL at 05:00

## 2024-12-20 RX ADMIN — POTASSIUM CHLORIDE 40 MILLIEQUIVALENT(S): 600 TABLET, FILM COATED, EXTENDED RELEASE ORAL at 11:30

## 2024-12-20 RX ADMIN — LIDOCAINE 2 PATCH: 50 OINTMENT TOPICAL at 19:31

## 2024-12-20 RX ADMIN — POTASSIUM CHLORIDE 40 MILLIEQUIVALENT(S): 600 TABLET, FILM COATED, EXTENDED RELEASE ORAL at 16:12

## 2024-12-20 RX ADMIN — Medication 800 MILLIGRAM(S): at 19:32

## 2024-12-20 RX ADMIN — SPIRONOLACTONE 25 MILLIGRAM(S): 50 TABLET ORAL at 17:48

## 2024-12-20 RX ADMIN — Medication 800 MILLIGRAM(S): at 08:45

## 2024-12-20 RX ADMIN — Medication 80 MILLIGRAM(S): at 09:11

## 2024-12-20 RX ADMIN — Medication 25 MILLIGRAM(S): at 05:00

## 2024-12-20 RX ADMIN — POTASSIUM CHLORIDE 40 MILLIEQUIVALENT(S): 600 TABLET, FILM COATED, EXTENDED RELEASE ORAL at 08:45

## 2024-12-20 RX ADMIN — LEVOTHYROXINE SODIUM 75 MICROGRAM(S): 175 TABLET ORAL at 05:00

## 2024-12-20 RX ADMIN — APIXABAN 5 MILLIGRAM(S): 5 TABLET, FILM COATED ORAL at 05:00

## 2024-12-20 RX ADMIN — LIDOCAINE 2 PATCH: 50 OINTMENT TOPICAL at 07:04

## 2024-12-20 RX ADMIN — Medication 25 MILLIGRAM(S): at 17:48

## 2024-12-20 RX ADMIN — APIXABAN 5 MILLIGRAM(S): 5 TABLET, FILM COATED ORAL at 17:48

## 2024-12-20 RX ADMIN — Medication 80 MILLIGRAM(S): at 16:12

## 2024-12-20 RX ADMIN — PANTOPRAZOLE 40 MILLIGRAM(S): 40 TABLET, DELAYED RELEASE ORAL at 07:46

## 2024-12-20 RX ADMIN — ROPINIROLE HYDROCHLORIDE 6 MILLIGRAM(S): 1 TABLET, FILM COATED ORAL at 05:25

## 2024-12-20 RX ADMIN — Medication 81 MILLIGRAM(S): at 11:31

## 2024-12-20 NOTE — PROGRESS NOTE ADULT - ASSESSMENT
70YO obese F w/ PMHx HTN, HLD, TIA x2 (~2019, no residual deficits), FHx CAD (mom), hypothyroidism, OA (b/l knee replacement), asthma, anxiety. Reports dyspnea on exertion with decreased exercise tolerance. Admitted to cardiac tele for CHF exacerbation and new onset afib w RVR. Optimizing on GDMT and undergoing IV diuresis.

## 2024-12-20 NOTE — PROGRESS NOTE ADULT - SUBJECTIVE AND OBJECTIVE BOX
Cardiology PA Adult Progress Note    SUBJECTIVE ASSESSMENT: Patient seen and examined at bedside with  present, she was sitting up in bed comfortably, eating breakfast. She reports orthopnea overnight but denies SOB when sitting up, also denies CP, dizziness, palpitations.   	  MEDICATIONS:  furosemide   Injectable 80 milliGRAM(s) IV Push <User Schedule>  metoprolol tartrate 25 milliGRAM(s) Oral two times a day  spironolactone 25 milliGRAM(s) Oral daily  rOPINIRole 6 milliGRAM(s) Oral daily PRN  zolpidem 5 milliGRAM(s) Oral at bedtime PRN  pantoprazole    Tablet 40 milliGRAM(s) Oral before breakfast  levothyroxine 75 MICROGram(s) Oral daily  apixaban 5 milliGRAM(s) Oral every 12 hours  aspirin enteric coated 81 milliGRAM(s) Oral daily  lidocaine   4% Patch 2 Patch Transdermal every 24 hours  potassium chloride    Tablet ER 40 milliEquivalent(s) Oral every 4 hours    	  VITAL SIGNS:  T(C): 36.6 (12-20-24 @ 09:38), Max: 36.9 (12-19-24 @ 13:27)  HR: 82 (12-20-24 @ 09:10) (82 - 111)  BP: 96/61 (12-20-24 @ 09:10) (96/61 - 139/86)  RR: 20 (12-20-24 @ 09:10) (18 - 22)  SpO2: 95% (12-20-24 @ 09:10) (93% - 96%)  Wt(kg): --    I&O's Summary    19 Dec 2024 07:01  -  20 Dec 2024 07:00  --------------------------------------------------------  IN: 519 mL / OUT: 5350 mL / NET: -4831 mL    20 Dec 2024 07:01  -  20 Dec 2024 10:09  --------------------------------------------------------  IN: 237 mL / OUT: 900 mL / NET: -663 mL                                        PHYSICAL EXAM:  Appearance: Normal	  HEENT: Normal oral mucosa, PERRL, EOMI	  Neck: Supple, - JVD; no Carotid Bruit   Cardiovascular: Normal S1 S2, No murmurs  Respiratory: Decreased Breath Sounds	  Gastrointestinal:  Soft, Non-tender, + BS	  Skin: No rashes, No ecchymoses, No cyanosis  Extremities: B/l LE edema R>L. Normal range of motion, No clubbing or cyanosis.  Vascular: Peripheral pulses palpable 2+ bilaterally  Neurologic: Non-focal  Psychiatry: A & O x 3, Mood & affect appropriate    LABS:	 	                 13.9   8.48  )-----------( 326      ( 20 Dec 2024 05:48 )             44.1     12-20    139  |  94[L]  |  17  ----------------------------<  108[H]  3.1[L]   |  33[H]  |  1.11    Ca    9.4      20 Dec 2024 05:48  Phos  3.4     12-18  Mg     1.8     12-20    TPro  7.1  /  Alb  3.8  /  TBili  0.6  /  DBili  x   /  AST  29  /  ALT  24  /  AlkPhos  141[H]  12-20    proBNP:   Lipid Profile:   HgA1c:   TSH:   PT/INR - ( 19 Dec 2024 05:30 )   PT: 13.2 sec;   INR: 1.13          PTT - ( 19 Dec 2024 10:13 )  PTT:123.7 sec Cardiology PA Adult Progress Note    SUBJECTIVE ASSESSMENT: Patient seen and examined at bedside with  present, she was sitting up in bed comfortably, eating breakfast. She reports orthopnea overnight but denies SOB when sitting up, also denies CP, dizziness, palpitations.   	  MEDICATIONS:  furosemide   Injectable 80 milliGRAM(s) IV Push <User Schedule>  metoprolol tartrate 25 milliGRAM(s) Oral two times a day  spironolactone 25 milliGRAM(s) Oral daily  rOPINIRole 6 milliGRAM(s) Oral daily PRN  zolpidem 5 milliGRAM(s) Oral at bedtime PRN  pantoprazole    Tablet 40 milliGRAM(s) Oral before breakfast  levothyroxine 75 MICROGram(s) Oral daily  apixaban 5 milliGRAM(s) Oral every 12 hours  aspirin enteric coated 81 milliGRAM(s) Oral daily  lidocaine   4% Patch 2 Patch Transdermal every 24 hours  potassium chloride    Tablet ER 40 milliEquivalent(s) Oral every 4 hours    	  VITAL SIGNS:  T(C): 36.6 (12-20-24 @ 09:38), Max: 36.9 (12-19-24 @ 13:27)  HR: 82 (12-20-24 @ 09:10) (82 - 111)  BP: 96/61 (12-20-24 @ 09:10) (96/61 - 139/86)  RR: 20 (12-20-24 @ 09:10) (18 - 22)  SpO2: 95% (12-20-24 @ 09:10) (93% - 96%)  Wt(kg): --    I&O's Summary    19 Dec 2024 07:01  -  20 Dec 2024 07:00  --------------------------------------------------------  IN: 519 mL / OUT: 5350 mL / NET: -4831 mL    20 Dec 2024 07:01  -  20 Dec 2024 10:09  --------------------------------------------------------  IN: 237 mL / OUT: 900 mL / NET: -663 mL                                        PHYSICAL EXAM:  Appearance: Normal	  HEENT: Normal oral mucosa, PERRL, EOMI	  Neck: Supple, - JVD; no Carotid Bruit   Cardiovascular: Normal S1 S2, No murmurs  Respiratory: No Rales, Rhonchi, Wheezing	  Gastrointestinal:  Soft, Non-tender, + BS	  Skin: No rashes, No ecchymoses, No cyanosis  Extremities: B/l LE edema R>L. Normal range of motion, No clubbing or cyanosis.  Vascular: Peripheral pulses palpable 2+ bilaterally  Neurologic: Non-focal  Psychiatry: A & O x 3, Mood & affect appropriate    LABS:	 	                 13.9   8.48  )-----------( 326      ( 20 Dec 2024 05:48 )             44.1     12-20    139  |  94[L]  |  17  ----------------------------<  108[H]  3.1[L]   |  33[H]  |  1.11    Ca    9.4      20 Dec 2024 05:48  Phos  3.4     12-18  Mg     1.8     12-20    TPro  7.1  /  Alb  3.8  /  TBili  0.6  /  DBili  x   /  AST  29  /  ALT  24  /  AlkPhos  141[H]  12-20    proBNP:   Lipid Profile:   HgA1c:   TSH:   PT/INR - ( 19 Dec 2024 05:30 )   PT: 13.2 sec;   INR: 1.13          PTT - ( 19 Dec 2024 10:13 )  PTT:123.7 sec

## 2024-12-20 NOTE — PROGRESS NOTE ADULT - PROBLEM SELECTOR PLAN 1
Warm and wet, BNP 1600, Trop 80->141->212->143, 95% on room air  - Etiology: TTE c/w Takotsubo per Dr. New  - CXR (12/18/24): Possible small left pleural effusion. No consolidation or pneumothorax.   - TTE (12/19/24): LVEF 23%, RWMA c/w ischemic heart disease (per echo read). mod TR, PASP 37mmHg.  - Net negative 2.5L today (12/19)  - GDMT: start Lopressor 25mg BID, Aldactone 25mg QD  - Diuretics: Lasix 80mg IVP BID Warm and wet, BNP 1600, Trop 80->141->212->143, 95% on room air  - Etiology: TTE c/w Takotsubo per Dr. New  - CXR (12/18/24): Possible small left pleural effusion. No consolidation or pneumothorax.   - TTE (12/19/24): LVEF 23%, RWMA c/w ischemic heart disease (per echo read). mod TR, PASP 37mmHg.  - Net negative 2.5L today (12/19)  - GDMT: start Lopressor 25mg BID, increasing Aldactone 25mg BID  - Diuretics: Lasix 80mg IVP BID Warm and wet, BNP 1600, Trop 80->141->212->143, 95% on room air  - Etiology: TTE c/w Takotsubo per Dr. New  - CXR (12/18/24): Possible small left pleural effusion. No consolidation or pneumothorax.   - TTE (12/19/24): LVEF 23%, RWMA c/w ischemic heart disease (per echo read). mod TR, PASP 37mmHg.  - Net negative 1.1L today (12/20), net negative 7.1L for length of stay  - GDMT: start Lopressor 25mg BID, increasing Aldactone 25mg BID  - Diuretics: Lasix 80mg IVP BID Warm and wet, BNP 1600, Trop 80->141->212->143, 95% on room air  - Etiology: TTE c/w Takotsubo per Dr. New  - CXR (12/18/24): Possible small left pleural effusion. No consolidation or pneumothorax.   - TTE (12/19/24): LVEF 23%, RWMA c/w ischemic heart disease (per echo read). mod TR, PASP 37mmHg.  - Net negative 1.1L today (12/20), net negative 7.1L for length of stay  - GDMT: start Lopressor 25mg BID, increasing Aldactone 25mg BID  - Diuretics: Lasix 80mg IVP BID  - Plan for CCTA next week to rule out CAD, for after DCCV

## 2024-12-20 NOTE — PROGRESS NOTE ADULT - PROBLEM SELECTOR PLAN 2
Presented w/ new onset afib rates now in 100s  - Rate Control: Lopressor 25mg BID  - AC: transitioned from Heparin gtt to Eliquis 5mg BID Presented w/ new onset afib rates now in 100s  - Rate Control: Lopressor 25mg BID  - AC: transitioned from Heparin gtt to Eliquis 5mg BID  - Tentative plan for DCCV on Monday

## 2024-12-20 NOTE — PROGRESS NOTE ADULT - PROBLEM SELECTOR PLAN 4
- C/w home Ropinirole 3mg 2 tabs PRN    F: None  E: Replete if K<4 or Mag<2  N: DASH Diet  VTEppx: Eliquis  GIppx: Protonix  Dispo: medically active

## 2024-12-21 LAB
ALBUMIN SERPL ELPH-MCNC: 3.7 G/DL — SIGNIFICANT CHANGE UP (ref 3.3–5)
ALP SERPL-CCNC: 133 U/L — HIGH (ref 40–120)
ALT FLD-CCNC: 20 U/L — SIGNIFICANT CHANGE UP (ref 10–45)
ANION GAP SERPL CALC-SCNC: 12 MMOL/L — SIGNIFICANT CHANGE UP (ref 5–17)
AST SERPL-CCNC: 22 U/L — SIGNIFICANT CHANGE UP (ref 10–40)
BASOPHILS # BLD AUTO: 0.06 K/UL — SIGNIFICANT CHANGE UP (ref 0–0.2)
BASOPHILS NFR BLD AUTO: 0.7 % — SIGNIFICANT CHANGE UP (ref 0–2)
BILIRUB SERPL-MCNC: 0.5 MG/DL — SIGNIFICANT CHANGE UP (ref 0.2–1.2)
BUN SERPL-MCNC: 24 MG/DL — HIGH (ref 7–23)
CALCIUM SERPL-MCNC: 9.3 MG/DL — SIGNIFICANT CHANGE UP (ref 8.4–10.5)
CHLORIDE SERPL-SCNC: 96 MMOL/L — SIGNIFICANT CHANGE UP (ref 96–108)
CO2 SERPL-SCNC: 30 MMOL/L — SIGNIFICANT CHANGE UP (ref 22–31)
CREAT SERPL-MCNC: 1.01 MG/DL — SIGNIFICANT CHANGE UP (ref 0.5–1.3)
EGFR: 60 ML/MIN/1.73M2 — SIGNIFICANT CHANGE UP
EOSINOPHIL # BLD AUTO: 0.37 K/UL — SIGNIFICANT CHANGE UP (ref 0–0.5)
EOSINOPHIL NFR BLD AUTO: 4.3 % — SIGNIFICANT CHANGE UP (ref 0–6)
GLUCOSE SERPL-MCNC: 106 MG/DL — HIGH (ref 70–99)
HCT VFR BLD CALC: 43.3 % — SIGNIFICANT CHANGE UP (ref 34.5–45)
HGB BLD-MCNC: 14 G/DL — SIGNIFICANT CHANGE UP (ref 11.5–15.5)
IMM GRANULOCYTES NFR BLD AUTO: 0.3 % — SIGNIFICANT CHANGE UP (ref 0–0.9)
LYMPHOCYTES # BLD AUTO: 3.45 K/UL — HIGH (ref 1–3.3)
LYMPHOCYTES # BLD AUTO: 40 % — SIGNIFICANT CHANGE UP (ref 13–44)
MAGNESIUM SERPL-MCNC: 1.9 MG/DL — SIGNIFICANT CHANGE UP (ref 1.6–2.6)
MCHC RBC-ENTMCNC: 29.7 PG — SIGNIFICANT CHANGE UP (ref 27–34)
MCHC RBC-ENTMCNC: 32.3 G/DL — SIGNIFICANT CHANGE UP (ref 32–36)
MCV RBC AUTO: 91.9 FL — SIGNIFICANT CHANGE UP (ref 80–100)
MONOCYTES # BLD AUTO: 0.85 K/UL — SIGNIFICANT CHANGE UP (ref 0–0.9)
MONOCYTES NFR BLD AUTO: 9.8 % — SIGNIFICANT CHANGE UP (ref 2–14)
NEUTROPHILS # BLD AUTO: 3.87 K/UL — SIGNIFICANT CHANGE UP (ref 1.8–7.4)
NEUTROPHILS NFR BLD AUTO: 44.9 % — SIGNIFICANT CHANGE UP (ref 43–77)
NRBC # BLD: 0 /100 WBCS — SIGNIFICANT CHANGE UP (ref 0–0)
PLATELET # BLD AUTO: 315 K/UL — SIGNIFICANT CHANGE UP (ref 150–400)
POTASSIUM SERPL-MCNC: 3.8 MMOL/L — SIGNIFICANT CHANGE UP (ref 3.5–5.3)
POTASSIUM SERPL-SCNC: 3.8 MMOL/L — SIGNIFICANT CHANGE UP (ref 3.5–5.3)
PROT SERPL-MCNC: 6.9 G/DL — SIGNIFICANT CHANGE UP (ref 6–8.3)
RBC # BLD: 4.71 M/UL — SIGNIFICANT CHANGE UP (ref 3.8–5.2)
RBC # FLD: 14.8 % — HIGH (ref 10.3–14.5)
SODIUM SERPL-SCNC: 138 MMOL/L — SIGNIFICANT CHANGE UP (ref 135–145)
WBC # BLD: 8.63 K/UL — SIGNIFICANT CHANGE UP (ref 3.8–10.5)
WBC # FLD AUTO: 8.63 K/UL — SIGNIFICANT CHANGE UP (ref 3.8–10.5)

## 2024-12-21 PROCEDURE — 99233 SBSQ HOSP IP/OBS HIGH 50: CPT

## 2024-12-21 RX ORDER — ALPRAZOLAM 0.25 MG/1
1 TABLET ORAL AT BEDTIME
Refills: 0 | Status: DISCONTINUED | OUTPATIENT
Start: 2024-12-21 | End: 2024-12-25

## 2024-12-21 RX ORDER — POTASSIUM CHLORIDE 600 MG/1
20 TABLET, FILM COATED, EXTENDED RELEASE ORAL ONCE
Refills: 0 | Status: COMPLETED | OUTPATIENT
Start: 2024-12-21 | End: 2024-12-21

## 2024-12-21 RX ORDER — ALPRAZOLAM 0.25 MG/1
1 TABLET ORAL ONCE
Refills: 0 | Status: DISCONTINUED | OUTPATIENT
Start: 2024-12-21 | End: 2024-12-21

## 2024-12-21 RX ADMIN — Medication 80 MILLIGRAM(S): at 11:24

## 2024-12-21 RX ADMIN — APIXABAN 5 MILLIGRAM(S): 5 TABLET, FILM COATED ORAL at 17:09

## 2024-12-21 RX ADMIN — Medication 400 MILLIGRAM(S): at 11:24

## 2024-12-21 RX ADMIN — LIDOCAINE 2 PATCH: 50 OINTMENT TOPICAL at 07:26

## 2024-12-21 RX ADMIN — PANTOPRAZOLE 40 MILLIGRAM(S): 40 TABLET, DELAYED RELEASE ORAL at 06:06

## 2024-12-21 RX ADMIN — POTASSIUM CHLORIDE 20 MILLIEQUIVALENT(S): 600 TABLET, FILM COATED, EXTENDED RELEASE ORAL at 11:24

## 2024-12-21 RX ADMIN — Medication 81 MILLIGRAM(S): at 11:25

## 2024-12-21 RX ADMIN — SPIRONOLACTONE 25 MILLIGRAM(S): 50 TABLET ORAL at 05:23

## 2024-12-21 RX ADMIN — LIDOCAINE 2 PATCH: 50 OINTMENT TOPICAL at 18:58

## 2024-12-21 RX ADMIN — APIXABAN 5 MILLIGRAM(S): 5 TABLET, FILM COATED ORAL at 05:23

## 2024-12-21 RX ADMIN — Medication 80 MILLIGRAM(S): at 17:08

## 2024-12-21 RX ADMIN — Medication 25 MILLIGRAM(S): at 17:08

## 2024-12-21 RX ADMIN — ROPINIROLE HYDROCHLORIDE 6 MILLIGRAM(S): 1 TABLET, FILM COATED ORAL at 00:33

## 2024-12-21 RX ADMIN — Medication 25 MILLIGRAM(S): at 05:23

## 2024-12-21 RX ADMIN — LIDOCAINE 2 PATCH: 50 OINTMENT TOPICAL at 19:48

## 2024-12-21 RX ADMIN — SPIRONOLACTONE 25 MILLIGRAM(S): 50 TABLET ORAL at 18:58

## 2024-12-21 RX ADMIN — LIDOCAINE 2 PATCH: 50 OINTMENT TOPICAL at 06:06

## 2024-12-21 RX ADMIN — LEVOTHYROXINE SODIUM 75 MICROGRAM(S): 175 TABLET ORAL at 05:22

## 2024-12-21 RX ADMIN — ALPRAZOLAM 1 MILLIGRAM(S): 0.25 TABLET ORAL at 19:51

## 2024-12-21 NOTE — PROGRESS NOTE ADULT - PROBLEM SELECTOR PLAN 4
- C/w home Ropinirole 3mg 2 tabs PRN    ##anxiety  - home med clonazepam 2mg; start Xanax 1mg prn qhs    F: None  E: Replete if K<4 or Mag<2  N: DASH Diet  VTEppx: Eliquis  GIppx: Protonix  Dispo: medically active

## 2024-12-21 NOTE — PROGRESS NOTE ADULT - PROBLEM SELECTOR PLAN 2
p/w  new onset afib rates now in 100s  - Rate Control: Lopressor 25mg BID  - AC: Eliquis 5mg BID  - Tentative plan for DCCV on Monday

## 2024-12-21 NOTE — PROGRESS NOTE ADULT - SUBJECTIVE AND OBJECTIVE BOX
Interventional Cardiology PA Adult Progress Note    Subjective Assessment: Seen and examined bedside. Denies chest pain, palpitations, SOB, orthopnea/PND, dizziness, LOC, n/v/d, fever/chills/sick contacts, LE edema. States she is feeling better but exhausted as she has not been sleeping.    ROS negative except as noted above.  	  MEDICATIONS:  furosemide   Injectable 80 milliGRAM(s) IV Push <User Schedule>  metoprolol tartrate 25 milliGRAM(s) Oral two times a day  spironolactone 25 milliGRAM(s) Oral two times a day  ALPRAZolam 1 milliGRAM(s) Oral once  rOPINIRole 6 milliGRAM(s) Oral daily PRN  zolpidem 5 milliGRAM(s) Oral at bedtime PRN  pantoprazole    Tablet 40 milliGRAM(s) Oral before breakfast  levothyroxine 75 MICROGram(s) Oral daily  apixaban 5 milliGRAM(s) Oral every 12 hours  aspirin enteric coated 81 milliGRAM(s) Oral daily  lidocaine   4% Patch 2 Patch Transdermal every 24 hours      	    PHYSICAL EXAM:  TELEMETRY:  T(C): 36.3 (12-21-24 @ 10:00), Max: 36.9 (12-20-24 @ 13:35)  HR: 93 (12-21-24 @ 11:20) (86 - 102)  BP: 122/86 (12-21-24 @ 11:20) (92/63 - 122/86)  RR: 20 (12-21-24 @ 11:20) (18 - 24)  SpO2: 95% (12-21-24 @ 11:20) (93% - 96%)  Wt(kg): --  I&O's Summary    20 Dec 2024 07:01  -  21 Dec 2024 07:00  --------------------------------------------------------  IN: 357 mL / OUT: 1700 mL / NET: -1343 mL    21 Dec 2024 07:01  -  21 Dec 2024 13:28  --------------------------------------------------------  IN: 300 mL / OUT: 700 mL / NET: -400 mL                                              Appearance: Normal	  HEENT:   Normal oral mucosa, PERRLA, EOMI	  Neck: Supple, + JVD; Carotid Bruit   Cardiovascular: Normal S1 S2, No JVD, No murmurs,   Respiratory: + rales  Gastrointestinal:  Soft, Non-tender, + BS	  Skin: No rashes, No ecchymoses, No cyanosis  Extremities: Normal range of motion, No clubbing, cyanosis; 2+ pitting edema b/l LE  Vascular: Peripheral pulses palpable 2+ bilaterally  Neurologic: Non-focal  Psychiatry: A & O x 3, Mood & affect appropriate               14.0   8.63  )-----------( 315      ( 21 Dec 2024 07:32 )             43.3     12-21    138  |  96  |  24[H]  ----------------------------<  106[H]  3.8   |  30  |  1.01    Ca    9.3      21 Dec 2024 07:32  Mg     1.9     12-21    TPro  6.9  /  Alb  3.7  /  TBili  0.5  /  DBili  x   /  AST  22  /  ALT  20  /  AlkPhos  133[H]  12-21

## 2024-12-21 NOTE — PROGRESS NOTE ADULT - ASSESSMENT
70 yo obese F w/ PMHx HTN, HLD, TIA x2 (~2019, no residual deficits), FHx CAD (mom), hypothyroidism, OA (b/l knee replacement), asthma, anxiety admitted to cardiac tele for HFrEF exacerbation and new onset afib w RVR. Optimizing on GDMT and undergoing IV diuresis. Plan for DCCV and CCTA 12/23

## 2024-12-21 NOTE — PROGRESS NOTE ADULT - PROBLEM SELECTOR PLAN 1
Warm and wet, BNP 1600, Trop 80->141->212->143, 95% on room air  - Etiology: TTE c/w Takotsubo per Dr. New  - CXR (12/18/24): Possible small left pleural effusion. No consolidation or pneumothorax.   - TTE (12/19/24): LVEF 23%, RWMA c/w ischemic heart disease (per echo read). mod TR, PASP 37mmHg.  - Negative 24hrs 1.3L (12/21); net negative 8.2L  - GDMT: c/w Lopressor 25mg BID, Aldactone 25mg BID  - Diuretics: Lasix 80mg IVP BID  - Plan for CCTA 12/23

## 2024-12-21 NOTE — PROGRESS NOTE ADULT - NSPROGADDITIONALINFOA_GEN_ALL_CORE
Attending Attestation:  I was physically present for the key portions of the evaluation and management (E/M) service provided. I reviewed relevant data including imaging, labs and prior procedure reports available. I agree with the above history, physical, and plan which I have reviewed with the following edits/addendum:    69F ++FHx CAD (mom), PMHx HTN, HLD, TIA x2 (~2019, no residual deficits), hypothyroidism, OA (b/l knee replacement), asthma, anxiety presented to [facility] ED c/o worsening TANNER and b/l LE edema x 1mo.     1. AF RVR  New onset AF with RVR, now HR improved, but remains in AF.  Continue eliquis and lopressor.  SHERINE (complete including wall motion) on Monday and DCCV.    2. Acute HF  New onset HF, stage C, echo reviewed, consistent with Takotsubo.   Continue Lasix 80 mg IV, lopressor 25 mg bid and aldactone.  CCTA on monday to rule out CAD.    3. NSTEMI type 2  Mild elevation of troponin outside of proportion of LV dysfunction suggestive of type 2, chest pain free. Once euvolemic and HR under control, CCTA.    Augie Arteaga MD  Cardiology

## 2024-12-22 LAB
ANION GAP SERPL CALC-SCNC: 13 MMOL/L — SIGNIFICANT CHANGE UP (ref 5–17)
BUN SERPL-MCNC: 27 MG/DL — HIGH (ref 7–23)
CALCIUM SERPL-MCNC: 9.6 MG/DL — SIGNIFICANT CHANGE UP (ref 8.4–10.5)
CHLORIDE SERPL-SCNC: 93 MMOL/L — LOW (ref 96–108)
CO2 SERPL-SCNC: 30 MMOL/L — SIGNIFICANT CHANGE UP (ref 22–31)
CREAT SERPL-MCNC: 1.06 MG/DL — SIGNIFICANT CHANGE UP (ref 0.5–1.3)
EGFR: 57 ML/MIN/1.73M2 — LOW
GLUCOSE SERPL-MCNC: 110 MG/DL — HIGH (ref 70–99)
HCT VFR BLD CALC: 46.4 % — HIGH (ref 34.5–45)
HGB BLD-MCNC: 15.4 G/DL — SIGNIFICANT CHANGE UP (ref 11.5–15.5)
MAGNESIUM SERPL-MCNC: 1.8 MG/DL — SIGNIFICANT CHANGE UP (ref 1.6–2.6)
MCHC RBC-ENTMCNC: 30.1 PG — SIGNIFICANT CHANGE UP (ref 27–34)
MCHC RBC-ENTMCNC: 33.2 G/DL — SIGNIFICANT CHANGE UP (ref 32–36)
MCV RBC AUTO: 90.6 FL — SIGNIFICANT CHANGE UP (ref 80–100)
NRBC # BLD: 0 /100 WBCS — SIGNIFICANT CHANGE UP (ref 0–0)
PLATELET # BLD AUTO: 350 K/UL — SIGNIFICANT CHANGE UP (ref 150–400)
POTASSIUM SERPL-MCNC: 3.8 MMOL/L — SIGNIFICANT CHANGE UP (ref 3.5–5.3)
POTASSIUM SERPL-SCNC: 3.8 MMOL/L — SIGNIFICANT CHANGE UP (ref 3.5–5.3)
RBC # BLD: 5.12 M/UL — SIGNIFICANT CHANGE UP (ref 3.8–5.2)
RBC # FLD: 14.6 % — HIGH (ref 10.3–14.5)
SODIUM SERPL-SCNC: 136 MMOL/L — SIGNIFICANT CHANGE UP (ref 135–145)
WBC # BLD: 9.25 K/UL — SIGNIFICANT CHANGE UP (ref 3.8–10.5)
WBC # FLD AUTO: 9.25 K/UL — SIGNIFICANT CHANGE UP (ref 3.8–10.5)

## 2024-12-22 PROCEDURE — 99233 SBSQ HOSP IP/OBS HIGH 50: CPT

## 2024-12-22 RX ORDER — VALSARTAN 80 MG/1
20 TABLET ORAL EVERY 12 HOURS
Refills: 0 | Status: DISCONTINUED | OUTPATIENT
Start: 2024-12-22 | End: 2024-12-23

## 2024-12-22 RX ORDER — VALSARTAN 80 MG/1
20 TABLET ORAL EVERY 12 HOURS
Refills: 0 | Status: DISCONTINUED | OUTPATIENT
Start: 2024-12-22 | End: 2024-12-22

## 2024-12-22 RX ORDER — POTASSIUM CHLORIDE 600 MG/1
20 TABLET, FILM COATED, EXTENDED RELEASE ORAL ONCE
Refills: 0 | Status: COMPLETED | OUTPATIENT
Start: 2024-12-22 | End: 2024-12-22

## 2024-12-22 RX ORDER — MAGNESIUM SULFATE 500 MG/ML
2 INJECTION, SOLUTION INTRAMUSCULAR; INTRAVENOUS ONCE
Refills: 0 | Status: COMPLETED | OUTPATIENT
Start: 2024-12-22 | End: 2024-12-22

## 2024-12-22 RX ORDER — METOPROLOL TARTRATE 50 MG
50 TABLET ORAL DAILY
Refills: 0 | Status: DISCONTINUED | OUTPATIENT
Start: 2024-12-22 | End: 2024-12-24

## 2024-12-22 RX ADMIN — LIDOCAINE 2 PATCH: 50 OINTMENT TOPICAL at 17:54

## 2024-12-22 RX ADMIN — ROPINIROLE HYDROCHLORIDE 6 MILLIGRAM(S): 1 TABLET, FILM COATED ORAL at 21:56

## 2024-12-22 RX ADMIN — Medication 25 MILLIGRAM(S): at 05:35

## 2024-12-22 RX ADMIN — SPIRONOLACTONE 25 MILLIGRAM(S): 50 TABLET ORAL at 17:54

## 2024-12-22 RX ADMIN — ALPRAZOLAM 1 MILLIGRAM(S): 0.25 TABLET ORAL at 21:57

## 2024-12-22 RX ADMIN — SPIRONOLACTONE 25 MILLIGRAM(S): 50 TABLET ORAL at 05:35

## 2024-12-22 RX ADMIN — LIDOCAINE 2 PATCH: 50 OINTMENT TOPICAL at 05:21

## 2024-12-22 RX ADMIN — LIDOCAINE 2 PATCH: 50 OINTMENT TOPICAL at 19:23

## 2024-12-22 RX ADMIN — APIXABAN 5 MILLIGRAM(S): 5 TABLET, FILM COATED ORAL at 05:35

## 2024-12-22 RX ADMIN — MAGNESIUM SULFATE 25 GRAM(S): 500 INJECTION, SOLUTION INTRAMUSCULAR; INTRAVENOUS at 07:38

## 2024-12-22 RX ADMIN — PANTOPRAZOLE 40 MILLIGRAM(S): 40 TABLET, DELAYED RELEASE ORAL at 05:35

## 2024-12-22 RX ADMIN — VALSARTAN 20 MILLIGRAM(S): 80 TABLET ORAL at 17:54

## 2024-12-22 RX ADMIN — Medication 80 MILLIGRAM(S): at 09:45

## 2024-12-22 RX ADMIN — Medication 50 MILLIGRAM(S): at 17:54

## 2024-12-22 RX ADMIN — Medication 81 MILLIGRAM(S): at 11:48

## 2024-12-22 RX ADMIN — POTASSIUM CHLORIDE 20 MILLIEQUIVALENT(S): 600 TABLET, FILM COATED, EXTENDED RELEASE ORAL at 07:37

## 2024-12-22 RX ADMIN — LEVOTHYROXINE SODIUM 75 MICROGRAM(S): 175 TABLET ORAL at 05:35

## 2024-12-22 RX ADMIN — APIXABAN 5 MILLIGRAM(S): 5 TABLET, FILM COATED ORAL at 17:54

## 2024-12-22 RX ADMIN — Medication 5 MILLIGRAM(S): at 01:41

## 2024-12-22 NOTE — PROGRESS NOTE ADULT - ASSESSMENT
68 yo obese F w/ PMHx HTN, HLD, TIA x2 (~2019, no residual deficits), FHx CAD (mom), hypothyroidism, OA (b/l knee replacement), asthma, anxiety admitted to cardiac tele for HFrEF exacerbation and new onset afib w RVR. Optimizing on GDMT and undergoing IV diuresis. Plan for DCCV and CCTA 12/23

## 2024-12-22 NOTE — PROGRESS NOTE ADULT - SUBJECTIVE AND OBJECTIVE BOX
OVERNIGHT EVENTS: NAEON    SUBJECTIVE / INTERVAL HPI: Patient seen and examined at bedside, no acute changes.     VITAL SIGNS:  Vital Signs Last 24 Hrs  T(C): 36.5 (22 Dec 2024 06:10), Max: 36.6 (21 Dec 2024 13:27)  T(F): 97.7 (22 Dec 2024 06:10), Max: 97.9 (21 Dec 2024 13:27)  HR: 100 (22 Dec 2024 04:31) (86 - 101)  BP: 133/75 (22 Dec 2024 04:31) (101/68 - 141/73)  BP(mean): 96 (22 Dec 2024 04:31) (79 - 99)  RR: 18 (22 Dec 2024 04:31) (18 - 23)  SpO2: 92% (22 Dec 2024 04:31) (92% - 95%)    Parameters below as of 22 Dec 2024 04:31  Patient On (Oxygen Delivery Method): room air      I&O's Summary    21 Dec 2024 07:01  -  22 Dec 2024 07:00  --------------------------------------------------------  IN: 918 mL / OUT: 3500 mL / NET: -2582 mL        PHYSICAL EXAM:    General: WDWN  HEENT: NC/AT; PERRL, anicteric sclera; MMM  Neck: supple  Cardiovascular: +S1/S2; RRR  Respiratory: CTA B/L; no W/R/R  Gastrointestinal: soft, NT/ND; +BSx4  Extremities: WWP; no edema, clubbing or cyanosis  Vascular: 2+ radial, DP/PT pulses B/L  Neurological: AAOx3; no focal deficits    MEDICATIONS:  MEDICATIONS  (STANDING):  apixaban 5 milliGRAM(s) Oral every 12 hours  aspirin enteric coated 81 milliGRAM(s) Oral daily  furosemide   Injectable 80 milliGRAM(s) IV Push <User Schedule>  levothyroxine 75 MICROGram(s) Oral daily  lidocaine   4% Patch 2 Patch Transdermal every 24 hours  magnesium sulfate  IVPB 2 Gram(s) IV Intermittent once  metoprolol tartrate 25 milliGRAM(s) Oral two times a day  pantoprazole    Tablet 40 milliGRAM(s) Oral before breakfast  potassium chloride    Tablet ER 20 milliEquivalent(s) Oral once  spironolactone 25 milliGRAM(s) Oral two times a day    MEDICATIONS  (PRN):  ALPRAZolam 1 milliGRAM(s) Oral at bedtime PRN anxiety / insomnia  rOPINIRole 6 milliGRAM(s) Oral daily PRN prn restless leg  zolpidem 5 milliGRAM(s) Oral at bedtime PRN Insomnia      ALLERGIES:  Allergies    No Known Allergies    Intolerances        LABS:                        15.4   9.25  )-----------( 350      ( 22 Dec 2024 06:42 )             46.4     12-22    136  |  93[L]  |  27[H]  ----------------------------<  110[H]  3.8   |  30  |  1.06    Ca    9.6      22 Dec 2024 06:42  Mg     1.8     12-22    TPro  6.9  /  Alb  3.7  /  TBili  0.5  /  DBili  x   /  AST  22  /  ALT  20  /  AlkPhos  133[H]  12-21      Urinalysis Basic - ( 22 Dec 2024 06:42 )    Color: x / Appearance: x / SG: x / pH: x  Gluc: 110 mg/dL / Ketone: x  / Bili: x / Urobili: x   Blood: x / Protein: x / Nitrite: x   Leuk Esterase: x / RBC: x / WBC x   Sq Epi: x / Non Sq Epi: x / Bacteria: x      CAPILLARY BLOOD GLUCOSE          RADIOLOGY & ADDITIONAL TESTS: Reviewed.

## 2024-12-22 NOTE — PROGRESS NOTE ADULT - NSPROGADDITIONALINFOA_GEN_ALL_CORE
Attending Attestation:  I was physically present for the key portions of the evaluation and management (E/M) service provided. I reviewed relevant data including imaging, labs and prior procedure reports available. I agree with the above history, physical, and plan which I have reviewed with the following edits/addendum:    69F ++FHx CAD (mom), PMHx HTN, HLD, TIA x2 (~2019, no residual deficits), hypothyroidism, OA (b/l knee replacement), asthma, anxiety presented to [facility] ED c/o worsening TANNER and b/l LE edema x 1mo. TTE 12/19/24 LVEF 25% w apical RWMA possible Takotsubo pattern.    1. AF RVR  New onset AF with RVR, unclear duration, now HR improved, but remains in AF.  Continue eliquis. Change metop tartrate 25 bid to Metop succ 50 OD.  SHERINE (complete including wall motion) 12/23 and DCCV.    2. Acute HFrEF  New onset HF, stage C, echo reviewed, consistent with Takotsubo if with normal coronaries.  Euvolemic on exam - stop IV diuresis today.  GDMT - metop succ 50, aldactone 25. Optimize, add ARB, Farxiga before DC.  CCTA 12/23 to rule out CAD.    3. NSTEMI type 2  Mild elevation of troponin outside of proportion of LV dysfunction suggestive of type 2 MI, chest pain free. Once cardioverted, plan for CCTA.    Augie Arteaga MD  Cardiology

## 2024-12-23 LAB
ALBUMIN SERPL ELPH-MCNC: 3.9 G/DL — SIGNIFICANT CHANGE UP (ref 3.3–5)
ALP SERPL-CCNC: 133 U/L — HIGH (ref 40–120)
ALT FLD-CCNC: 20 U/L — SIGNIFICANT CHANGE UP (ref 10–45)
ANION GAP SERPL CALC-SCNC: 13 MMOL/L — SIGNIFICANT CHANGE UP (ref 5–17)
APTT BLD: 34 SEC — SIGNIFICANT CHANGE UP (ref 24.5–35.6)
AST SERPL-CCNC: 26 U/L — SIGNIFICANT CHANGE UP (ref 10–40)
BASOPHILS # BLD AUTO: 0.07 K/UL — SIGNIFICANT CHANGE UP (ref 0–0.2)
BASOPHILS NFR BLD AUTO: 0.8 % — SIGNIFICANT CHANGE UP (ref 0–2)
BILIRUB SERPL-MCNC: 0.6 MG/DL — SIGNIFICANT CHANGE UP (ref 0.2–1.2)
BLD GP AB SCN SERPL QL: NEGATIVE — SIGNIFICANT CHANGE UP
BUN SERPL-MCNC: 35 MG/DL — HIGH (ref 7–23)
CALCIUM SERPL-MCNC: 9.7 MG/DL — SIGNIFICANT CHANGE UP (ref 8.4–10.5)
CHLORIDE SERPL-SCNC: 92 MMOL/L — LOW (ref 96–108)
CO2 SERPL-SCNC: 31 MMOL/L — SIGNIFICANT CHANGE UP (ref 22–31)
CREAT SERPL-MCNC: 1.59 MG/DL — HIGH (ref 0.5–1.3)
EGFR: 35 ML/MIN/1.73M2 — LOW
EOSINOPHIL # BLD AUTO: 0.26 K/UL — SIGNIFICANT CHANGE UP (ref 0–0.5)
EOSINOPHIL NFR BLD AUTO: 2.8 % — SIGNIFICANT CHANGE UP (ref 0–6)
GLUCOSE SERPL-MCNC: 101 MG/DL — HIGH (ref 70–99)
HCT VFR BLD CALC: 45.5 % — HIGH (ref 34.5–45)
HGB BLD-MCNC: 14.9 G/DL — SIGNIFICANT CHANGE UP (ref 11.5–15.5)
IMM GRANULOCYTES NFR BLD AUTO: 0.2 % — SIGNIFICANT CHANGE UP (ref 0–0.9)
INR BLD: 1.29 — HIGH (ref 0.85–1.16)
LYMPHOCYTES # BLD AUTO: 3.62 K/UL — HIGH (ref 1–3.3)
LYMPHOCYTES # BLD AUTO: 39.1 % — SIGNIFICANT CHANGE UP (ref 13–44)
MAGNESIUM SERPL-MCNC: 2.2 MG/DL — SIGNIFICANT CHANGE UP (ref 1.6–2.6)
MCHC RBC-ENTMCNC: 29.6 PG — SIGNIFICANT CHANGE UP (ref 27–34)
MCHC RBC-ENTMCNC: 32.7 G/DL — SIGNIFICANT CHANGE UP (ref 32–36)
MCV RBC AUTO: 90.3 FL — SIGNIFICANT CHANGE UP (ref 80–100)
MONOCYTES # BLD AUTO: 0.91 K/UL — HIGH (ref 0–0.9)
MONOCYTES NFR BLD AUTO: 9.8 % — SIGNIFICANT CHANGE UP (ref 2–14)
NEUTROPHILS # BLD AUTO: 4.38 K/UL — SIGNIFICANT CHANGE UP (ref 1.8–7.4)
NEUTROPHILS NFR BLD AUTO: 47.3 % — SIGNIFICANT CHANGE UP (ref 43–77)
NRBC # BLD: 0 /100 WBCS — SIGNIFICANT CHANGE UP (ref 0–0)
PHOSPHATE SERPL-MCNC: 5 MG/DL — HIGH (ref 2.5–4.5)
PLATELET # BLD AUTO: 344 K/UL — SIGNIFICANT CHANGE UP (ref 150–400)
POTASSIUM SERPL-MCNC: 4 MMOL/L — SIGNIFICANT CHANGE UP (ref 3.5–5.3)
POTASSIUM SERPL-SCNC: 4 MMOL/L — SIGNIFICANT CHANGE UP (ref 3.5–5.3)
PROT SERPL-MCNC: 7.4 G/DL — SIGNIFICANT CHANGE UP (ref 6–8.3)
PROTHROM AB SERPL-ACNC: 15.1 SEC — HIGH (ref 9.9–13.4)
RBC # BLD: 5.04 M/UL — SIGNIFICANT CHANGE UP (ref 3.8–5.2)
RBC # FLD: 14.8 % — HIGH (ref 10.3–14.5)
RH IG SCN BLD-IMP: POSITIVE — SIGNIFICANT CHANGE UP
SODIUM SERPL-SCNC: 136 MMOL/L — SIGNIFICANT CHANGE UP (ref 135–145)
WBC # BLD: 9.26 K/UL — SIGNIFICANT CHANGE UP (ref 3.8–10.5)
WBC # FLD AUTO: 9.26 K/UL — SIGNIFICANT CHANGE UP (ref 3.8–10.5)

## 2024-12-23 PROCEDURE — 99222 1ST HOSP IP/OBS MODERATE 55: CPT

## 2024-12-23 PROCEDURE — 99233 SBSQ HOSP IP/OBS HIGH 50: CPT

## 2024-12-23 RX ORDER — HEPARIN SODIUM 1000 [USP'U]/ML
9500 INJECTION, SOLUTION INTRAVENOUS; SUBCUTANEOUS EVERY 6 HOURS
Refills: 0 | Status: DISCONTINUED | OUTPATIENT
Start: 2024-12-23 | End: 2024-12-24

## 2024-12-23 RX ORDER — SODIUM CHLORIDE 9 MG/ML
500 INJECTION, SOLUTION INTRAMUSCULAR; INTRAVENOUS; SUBCUTANEOUS ONCE
Refills: 0 | Status: COMPLETED | OUTPATIENT
Start: 2024-12-23 | End: 2024-12-23

## 2024-12-23 RX ORDER — HEPARIN SODIUM 1000 [USP'U]/ML
INJECTION, SOLUTION INTRAVENOUS; SUBCUTANEOUS
Qty: 25000 | Refills: 0 | Status: DISCONTINUED | OUTPATIENT
Start: 2024-12-23 | End: 2024-12-24

## 2024-12-23 RX ORDER — SODIUM CHLORIDE 9 MG/ML
1000 INJECTION, SOLUTION INTRAMUSCULAR; INTRAVENOUS; SUBCUTANEOUS
Refills: 0 | Status: DISCONTINUED | OUTPATIENT
Start: 2024-12-24 | End: 2024-12-24

## 2024-12-23 RX ORDER — CHLORHEXIDINE GLUCONATE 1.2 MG/ML
1 RINSE ORAL ONCE
Refills: 0 | Status: COMPLETED | OUTPATIENT
Start: 2024-12-23 | End: 2024-12-24

## 2024-12-23 RX ORDER — HEPARIN SODIUM 1000 [USP'U]/ML
4500 INJECTION, SOLUTION INTRAVENOUS; SUBCUTANEOUS EVERY 6 HOURS
Refills: 0 | Status: DISCONTINUED | OUTPATIENT
Start: 2024-12-23 | End: 2024-12-24

## 2024-12-23 RX ORDER — CLOPIDOGREL BISULFATE 75 MG/1
600 TABLET, FILM COATED ORAL ONCE
Refills: 0 | Status: COMPLETED | OUTPATIENT
Start: 2024-12-24 | End: 2024-12-24

## 2024-12-23 RX ADMIN — LEVOTHYROXINE SODIUM 75 MICROGRAM(S): 175 TABLET ORAL at 05:21

## 2024-12-23 RX ADMIN — LIDOCAINE 2 PATCH: 50 OINTMENT TOPICAL at 04:50

## 2024-12-23 RX ADMIN — SPIRONOLACTONE 25 MILLIGRAM(S): 50 TABLET ORAL at 05:21

## 2024-12-23 RX ADMIN — SODIUM CHLORIDE 1000 MILLILITER(S): 9 INJECTION, SOLUTION INTRAMUSCULAR; INTRAVENOUS; SUBCUTANEOUS at 17:40

## 2024-12-23 RX ADMIN — VALSARTAN 20 MILLIGRAM(S): 80 TABLET ORAL at 05:21

## 2024-12-23 RX ADMIN — Medication 5 MILLIGRAM(S): at 00:16

## 2024-12-23 RX ADMIN — LIDOCAINE 2 PATCH: 50 OINTMENT TOPICAL at 19:35

## 2024-12-23 RX ADMIN — APIXABAN 5 MILLIGRAM(S): 5 TABLET, FILM COATED ORAL at 05:21

## 2024-12-23 RX ADMIN — HEPARIN SODIUM 2100 UNIT(S)/HR: 1000 INJECTION, SOLUTION INTRAVENOUS; SUBCUTANEOUS at 17:46

## 2024-12-23 RX ADMIN — Medication 81 MILLIGRAM(S): at 12:07

## 2024-12-23 RX ADMIN — Medication 50 MILLIGRAM(S): at 05:20

## 2024-12-23 RX ADMIN — PANTOPRAZOLE 40 MILLIGRAM(S): 40 TABLET, DELAYED RELEASE ORAL at 05:21

## 2024-12-23 NOTE — PROGRESS NOTE ADULT - ASSESSMENT
70 yo obese F w/ PMHx HTN, HLD, TIA x2 (~2019, no residual deficits), FHx CAD (mom), hypothyroidism, OA (b/l knee replacement), asthma, anxiety admitted to cardiac tele for HFrEF exacerbation and new onset afib w RVR. Optimizing on GDMT and undergoing IV diuresis. Plan for R/LHC followed by SHERINE/DCCV 12/24. NPO @MN    -ASA   III  -Mallampati III  -suitable candidate for moderate sedation  -DAPT: c/w ASA 81mg qd, plavix 600mg x1  -IVF: NS 50cc/2hrs (ordered for 5am 12/24 per Dr Greer)  -cath consent obtained and placed in chart    Risks & benefits of procedure and alternative therapy have been explained to the patient including but not limited to: allergic reaction, bleeding w/possible need for blood transfusion, infection, renal and vascular compromise, limb damage, arrhythmia, stroke, vessel dissection/perforation, Myocardial infarction, emergent CABG.

## 2024-12-23 NOTE — DIETITIAN INITIAL EVALUATION ADULT - NS FNS DIET ORDER
Diet, NPO after Midnight:      NPO Start Date: 22-Dec-2024,   NPO Start Time: 23:59  Except Medications (12-22-24 @ 07:31) [Active]  Diet, DASH/TLC:   Sodium & Cholesterol Restricted  1200mL Fluid Restriction ([id number]) (12-18-24 @ 21:11) [Active]

## 2024-12-23 NOTE — DIETITIAN INITIAL EVALUATION ADULT - PERTINENT MEDS FT
MEDICATIONS  (STANDING):  apixaban 5 milliGRAM(s) Oral every 12 hours  aspirin enteric coated 81 milliGRAM(s) Oral daily  levothyroxine 75 MICROGram(s) Oral daily  lidocaine   4% Patch 2 Patch Transdermal every 24 hours  metoprolol succinate ER 50 milliGRAM(s) Oral daily  pantoprazole    Tablet 40 milliGRAM(s) Oral before breakfast    MEDICATIONS  (PRN):  ALPRAZolam 1 milliGRAM(s) Oral at bedtime PRN Anxiety and/or Insomnia  rOPINIRole 6 milliGRAM(s) Oral daily PRN prn restless leg  zolpidem 5 milliGRAM(s) Oral at bedtime PRN Insomnia

## 2024-12-23 NOTE — DIETITIAN INITIAL EVALUATION ADULT - OTHER INFO
"68 yo obese F w/ PMHx HTN, HLD, TIA x2 (~2019, no residual deficits), FHx CAD (mom), hypothyroidism, OA (b/l knee replacement), asthma, anxiety admitted to cardiac tele for HFrEF exacerbation and new onset afib w RVR. Optimizing on GDMT and undergoing IV diuresis. Plan for DCCV and CCTA 12/23"     Visited pt at bedside on 5LA for initial assessment. Family present at bedside. States that at home pt has a good appetite. Does not follow any therapeutic diet at home. No cultural, ethnic, Christian food preferences noted. Confirms No known food allergies. States that she takes vitamin D at home. Reports no additional use of oral nutritional supplement. No difficulties chewing or swallowing reported per pt at time of visit. No issues with nausea/vomiting/constipation/diarrhea at this time. Last BM noted 12/22 per pt. Dosing weight 12/19 255 pounds, UBW: 255 pounds per pt. States that weights have been stable x 3 months. IBW: 115 pounds, %IBW: 221%. Observed with no overt signs and symptoms of muscle or fat wasting. Based on ASPEN guidelines, pt does not meet criteria for malnutrition. Current diet NPO pending procedure. Previously on DASH/TLC, states that she has a good appetite, previously consuming ~75% of meals while inpatient. 0/10 pain per flowsheets. No Pressure Injuries per flow sheets. Ari Score: 21; Edema: 3+ b/l leg per flow sheets. Meds reviewed. Synthroid. Nutritionally Pertinent Labs 12/23 Cl: 92 (L), BUN: 35 (H), Creat: 1.59 (H), Gluc: 101 (H), Phos: 5.0 (H). See Nutrition recommendation below.

## 2024-12-23 NOTE — CONSULT NOTE ADULT - NS ATTEND AMEND GEN_ALL_CORE FT
68 y/o F visiting from [place], with overweight, HTN, dyslipidemia, TIA x 2 (~2019, no residual deficits), hypothyroidism, OA (b/l knee replacement), asthma, anxiety and insomnia presented to [facility] ED c/o worsening TANNER and b/l LE edema x 3 months. Found to be in rapid AFib and TTE showed newly reduced EF 25% with takosubo pattern. BNP 1600 and elevated troponin. Patient is pending a C Tues am. We will plan for SHERINE/DCCV following [facility] to restore SR. I advised on her following up with cardiology and EP once she returns to [place]. She will need long term anticoagulation for [id number] 4 for stroke prevention and bb for rate control.

## 2024-12-23 NOTE — DIETITIAN INITIAL EVALUATION ADULT - EDUCATION DIETARY MODIFICATIONS
Discussed DASH diet education. Reviewed foods high in Na and cholesterol to avoid. Pt receptive and verbalizes understanding./(2) meets goals/outcomes/verbalization

## 2024-12-23 NOTE — CONSULT NOTE ADULT - SUBJECTIVE AND OBJECTIVE BOX
Electrophysiology Consult Note:     CHIEF COMPLAINT:  Patient is a 69y old  Female who presents with a chief complaint of ATRIAL FIBRILLATION WITHRVR     (23 Dec 2024 12:51)        HISTORY OF PRESENT ILLNESS:   70 y/o F visiting from [place], with overweight, HTN, dyslipidemia, TIA x 2 (~2019, no residual deficits), hypothyroidism, OA (b/l knee replacement), asthma, anxiety and insomnia presented to [facility] ED c/o worsening TANNER and b/l LE edema x 3 months. States she can only go 5 steps without losing her breath. Symptoms got worse last week and so went to ER on 24.  Found to have AFIB RVR.  Admitted for rate control.  Echo here showed LVEF 25% with regional wall motion abnormalities consistent with ischemic heart disease (takosubo pattern). BNP 1600.  Troponin T is positive (peak 212).  She was started on metoprolol for rate control and Eliquis.  Eliquis was switched over to Heparin gtt today in anticipation for cardiac cath.  She was diuresed while here and is net negative 14 liters so far.  She reports feeling much better today with breathing.  She is still not aware of irregular heart beat or palpitations.  No chest pain / syncope / dizziness.  Has baseline LE edema.  Denies bleeding issue.           PAST MEDICAL & SURGICAL HISTORY:  Transient ischemic attack (TIA)    HTN (hypertension)    HLD (hyperlipidemia)    Hypothyroidism    Asthma    Restless leg          FAMILY HISTORY:  FH: CAD (coronary artery disease) (Mother)  FHx: stroke (Father)      SOCIAL HISTORY:    no tob   no illicti drug use    Allergies  No Known Allergies        MEDICATIONS  (STANDING):  aspirin enteric coated 81 milliGRAM(s) Oral daily  chlorhexidine 4% Liquid 1 Application(s) Topical once  heparin  Infusion.  Unit(s)/Hr (21 mL/Hr) IV Continuous <Continuous>  levothyroxine 75 MICROGram(s) Oral daily  lidocaine   4% Patch 2 Patch Transdermal every 24 hours  metoprolol succinate ER 50 milliGRAM(s) Oral daily  pantoprazole    Tablet 40 milliGRAM(s) Oral before breakfast    MEDICATIONS  (PRN):  ALPRAZolam 1 milliGRAM(s) Oral at bedtime PRN Anxiety and/or Insomnia  heparin   Injectable 4500 Unit(s) IV Push every 6 hours PRN For aPTT between 40 - 57  heparin   Injectable 9500 Unit(s) IV Push every 6 hours PRN For aPTT less than 40  rOPINIRole 6 milliGRAM(s) Oral daily PRN prn restless leg  zolpidem 5 milliGRAM(s) Oral at bedtime PRN Insomnia        REVIEW OF SYSTEMS:  CONSTITUTIONAL: No fever, weight loss. +fatigue  EYES: No eye pain, visual disturbances, or discharge  ENMT:  No difficulty hearing, tinnitus, vertigo; No sinus or throat pain  NECK: No pain or stiffness  RESPIRATORY: No cough, wheezing, chills or hemoptysis; No Shortness of Breath  CARDIOVASCULAR: See HPI.   GASTROINTESTINAL: No abdominal or epigastric pain. No nausea, vomiting, or hematemesis; No diarrhea or constipation. No melena or hematochezia.  GENITOURINARY: No dysuria, frequency, hematuria, or incontinence  NEUROLOGICAL: No headaches, memory loss, loss of strength, numbness, or tremors  SKIN: No itching, burning, rashes, or lesions   LYMPH Nodes: No enlarged glands  ENDOCRINE: No heat or cold intolerance; No hair loss  MUSCULOSKELETAL: No joint pain or swelling; No muscle, back, or extremity pain  PSYCHIATRIC: No depression, anxiety, mood swings, or difficulty sleeping  HEME/LYMPH: No easy bruising, or bleeding gums  ALLERY AND IMMUNOLOGIC: No hives or eczema	      PHYSICAL EXAM:  Vital Signs Last 24 Hrs  T(C): 36.9 (23 Dec 2024 13:28), Max: 36.9 (23 Dec 2024 13:28)  T(F): 98.5 (23 Dec 2024 13:28), Max: 98.5 (23 Dec 2024 13:28)  HR: 91 (23 Dec 2024 12:14) (82 - 110)  BP: 95/66 (23 Dec 2024 12:14) (95/66 - 128/67)  BP(mean): 76 (23 Dec 2024 12:14) (76 - 95)  RR: 18 (23 Dec 2024 12:14) (18 - 22)  SpO2: 93% (23 Dec 2024 12:14) (90% - 95%)    Parameters below as of 23 Dec 2024 12:14  Patient On (Oxygen Delivery Method): room air      Constitutional: NAD	  HEENT:   Normal oral mucosa, PERRL, EOMI	  Neck: No JVD  CVS: Normal S1 / S2, irregular, No murmurs  Pulm: CTA. No wheeze or rale  GI:  + BS, soft, NT / ND   Ext: non-pitting LE edema  Vascular: Peripheral pulses palpable 2+ bilaterally  Neurologic: A&O x 3, Non-focal  Psych: Pleasant, has good insight  Skin: No rash or lesion       	  LABS:	                         14.9   9.26  )-----------( 344      ( 23 Dec 2024 05:28 )             45.5     12    136  |  92[L]  |  35[H]  ----------------------------<  101[H]  4.0   |  31  |  1.59[H]    Ca    9.7      23 Dec 2024 05:28  Phos  5.0     12  Mg     2.2         TPro  7.4  /  Alb  3.9  /  TBili  0.6  /  DBili  x   /  AST  26  /  ALT  20  /  AlkPhos  133[H]  -      EK24: AFIB  bpm.   Telemetry: AFIB VR 80-90s.      TTE Echo Complete w/ Contrast w/ Doppler (. @ 09:17) >   1. Multiple segmental abnormalities exist. See findings.   2. Normal left ventricular size.   3. Severely reduced left ventricular systolic function.   4. Regional wall motion abnormalities consistent with ischemic heart   disease.--- The entire apex, mid and apical anterior wall, mid and apical anterior  septum, mid and apical inferior septum, mid and apical inferior wall, mid  inferolateral segment, and mid anterolateral segment are akinetic. The   basal anteroseptal segment is hypokinetic. All remaining scored segments are  normal.   5. Normal right ventricular size.   6. Reduced right ventricular systolic function.   7. Normal atria.   8. Moderate tricuspid regurgitation.   9. Aortic sclerosis without significant stenosis.  10. Pulmonary artery systolic pressure is 37 mmHg.  11. No pericardial effusion.  12. No prior echo is available for comparison.  13. Patient was tachycardic during the study.

## 2024-12-23 NOTE — DIETITIAN INITIAL EVALUATION ADULT - PERTINENT LABORATORY DATA
12-23    136  |  92[L]  |  35[H]  ----------------------------<  101[H]  4.0   |  31  |  1.59[H]    Ca    9.7      23 Dec 2024 05:28  Phos  5.0     12-23  Mg     2.2     12-23    TPro  7.4  /  Alb  3.9  /  TBili  0.6  /  DBili  x   /  AST  26  /  ALT  20  /  AlkPhos  133[H]  12-23  A1C with Estimated Average Glucose Result: 5.8 % (12-19-24 @ 05:30)

## 2024-12-23 NOTE — PROGRESS NOTE ADULT - SUBJECTIVE AND OBJECTIVE BOX
Interventional Cardiology PA Adult Progress Note    Subjective Assessment: Seen and examined bedside. Denies chest pain, palpitations, SOB, orthopnea/PND, dizziness, LOC, n/v/d, fever/chills/sick contacts, LE edema.     ROS negative except as noted above.  	  MEDICATIONS:  metoprolol succinate ER 50 milliGRAM(s) Oral daily  ALPRAZolam 1 milliGRAM(s) Oral at bedtime PRN  rOPINIRole 6 milliGRAM(s) Oral daily PRN  zolpidem 5 milliGRAM(s) Oral at bedtime PRN  pantoprazole    Tablet 40 milliGRAM(s) Oral before breakfast  levothyroxine 75 MICROGram(s) Oral daily  aspirin enteric coated 81 milliGRAM(s) Oral daily  chlorhexidine 4% Liquid 1 Application(s) Topical once  heparin   Injectable 4500 Unit(s) IV Push every 6 hours PRN  heparin   Injectable 9500 Unit(s) IV Push every 6 hours PRN  heparin  Infusion.  Unit(s)/Hr IV Continuous <Continuous>  lidocaine   4% Patch 2 Patch Transdermal every 24 hours      	    PHYSICAL EXAM:  TELEMETRY:  T(C): 36.9 (12-23-24 @ 13:28), Max: 36.9 (12-23-24 @ 13:28)  HR: 91 (12-23-24 @ 12:14) (82 - 110)  BP: 95/66 (12-23-24 @ 12:14) (95/66 - 128/67)  RR: 18 (12-23-24 @ 12:14) (18 - 22)  SpO2: 93% (12-23-24 @ 12:14) (90% - 95%)  Wt(kg): --  I&O's Summary    22 Dec 2024 07:01  -  23 Dec 2024 07:00  --------------------------------------------------------  IN: 1580 mL / OUT: 1000 mL / NET: 580 mL                                              Appearance: Normal	  HEENT:   Normal oral mucosa, PERRLA, EOMI	  Neck: Supple,  - JVD; Carotid Bruit   Cardiovascular: Normal S1 S2, No JVD, No murmurs,   Respiratory: Lungs clear to auscultation, No Rales, Rhonchi, Wheezing	  Gastrointestinal:  Soft, Non-tender, + BS	  Skin: No rashes, No ecchymoses, No cyanosis  Extremities: Normal range of motion, No clubbing, cyanosis or edema  Vascular: Peripheral pulses palpable 2+ bilaterally  Neurologic: Non-focal  Psychiatry: A & O x 3, Mood & affect appropriate                 14.9   9.26  )-----------( 344      ( 23 Dec 2024 05:28 )             45.5     12-23    136  |  92[L]  |  35[H]  ----------------------------<  101[H]  4.0   |  31  |  1.59[H]    Ca    9.7      23 Dec 2024 05:28  Phos  5.0     12-23  Mg     2.2     12-23    TPro  7.4  /  Alb  3.9  /  TBili  0.6  /  DBili  x   /  AST  26  /  ALT  20  /  AlkPhos  133[H]  12-23  PT/INR - ( 23 Dec 2024 05:28 )   PT: 15.1 sec;   INR: 1.29          PTT - ( 23 Dec 2024 05:28 )  PTT:34.0 sec

## 2024-12-23 NOTE — PROGRESS NOTE ADULT - PROBLEM SELECTOR PLAN 2
p/w  new onset afib rates now in 100s  - Rate Control: Toprol 50mg qd  - AC: hold eliquis for LHC, start heparin gtt  - plan for SHERINE/DCCV 12/24 after R/LHC

## 2024-12-23 NOTE — DIETITIAN NUTRITION RISK NOTIFICATION - ADDITIONAL COMMENTS/DIETITIAN RECOMMENDATIONS
**as medically feasible, advance diet as tolerated to DASH/TLC diet, provide nourishments and/or oral nutrition supplements per pt preference  2. Monitor PO intake, diet tolerance, weight trends, labs, and skin integrity and bowel movement regularity.   3. Encourage PO intake and honor food preferences as able unless otherwise contraindicated.   4. RDN will continue to monitor, reassess, and intervene as appropriate.

## 2024-12-23 NOTE — DIETITIAN NUTRITION RISK NOTIFICATION - TREATMENT: THE FOLLOWING DIET HAS BEEN RECOMMENDED
Diet, DASH/TLC:   Sodium & Cholesterol Restricted  1200mL Fluid Restriction ([id number]) (12-18-24 @ 21:11) [Active]

## 2024-12-23 NOTE — PROGRESS NOTE ADULT - PROBLEM SELECTOR PLAN 1
Warm and wet, BNP 1600, Trop 80->141->212->143, 95% on room air  - Etiology: TTE c/w Takotsubo per Dr. New  - CXR (12/18/24): Possible small left pleural effusion. No consolidation or pneumothorax.   - TTE (12/19/24): LVEF 23%, RWMA c/w ischemic heart disease (per echo read). mod TR, PASP 37mmHg.  - net negative ~10L  - GDMT: c/w Toprol 50mg qd     o holding valsartan / tyler 2/2 ALANNA  - Diuretics: s/p diuresis, now euvolemic  - Plan for cardiac cath 12/24    ##ALANNA  12/23 Cr 1.59 (previously normal)  - likely 2/2 overdiuresis; dc'd diuretics 12/22  - 500cc bolus given  - pre-cath IVF as above 12/24 AM  - stop valsartan and tyler

## 2024-12-23 NOTE — CONSULT NOTE ADULT - ASSESSMENT
70 y/o F visiting from [place], with overweight, HTN, dyslipidemia, TIA x 2 (~2019, no residual deficits), hypothyroidism, OA (b/l knee replacement), asthma, anxiety and insomnia presented to [facility] ED c/o worsening TANNER and b/l LE edema x 3 months.  Found to have acute HF in setting of newly diagnosed AFIB RVR.  LVEF 25% with regional wall motion abnormalities (with Takotsubo pattern). Also made troponin.   - Rate is better controlled now with Metoprolol. Discussed plan for CASEY/ DCCV after cath.  If cath is negative for obstructive disease and no stent placed, please switch her back to Leida when it's safe from sheath removal.    - Explained the need for a/c for stroke prevention.     - HF meds per team.   - Please make her NPO after midnight for Casey/ dccv tuesday

## 2024-12-23 NOTE — DIETITIAN INITIAL EVALUATION ADULT - OTHER CALCULATIONS
Ideal body weight (52.1kg) used to calculate energy needs due to pt's current body weight exceeds % (221%) ideal body weight. Needs adjusted for clinical course, advanced age.

## 2024-12-23 NOTE — DIETITIAN INITIAL EVALUATION ADULT - ADD RECOMMEND
1. NPO  **as medically feasible, advance diet as tolerated to DASH/TLC diet, provide nourishments and/or oral nutrition supplements per pt preference  2. Monitor PO intake, diet tolerance, weight trends, labs, and skin integrity and bowel movement regularity.   3. Encourage PO intake and honor food preferences as able unless otherwise contraindicated.   4. RDN will continue to monitor, reassess, and intervene as appropriate.

## 2024-12-24 DIAGNOSIS — R79.89 OTHER SPECIFIED ABNORMAL FINDINGS OF BLOOD CHEMISTRY: ICD-10-CM

## 2024-12-24 LAB
ADD ON TEST-SPECIMEN IN LAB: SIGNIFICANT CHANGE UP
APTT BLD: 42 SEC — HIGH (ref 24.5–35.6)
APTT BLD: >200 SEC — CRITICAL HIGH (ref 24.5–35.6)
APTT BLD: >200 SEC — CRITICAL HIGH (ref 24.5–35.6)
HCT VFR BLD CALC: 43.8 % — SIGNIFICANT CHANGE UP (ref 34.5–45)
HCT VFR BLD CALC: 45.1 % — HIGH (ref 34.5–45)
HGB BLD-MCNC: 14.5 G/DL — SIGNIFICANT CHANGE UP (ref 11.5–15.5)
HGB BLD-MCNC: 14.6 G/DL — SIGNIFICANT CHANGE UP (ref 11.5–15.5)
INR BLD: 1.36 — HIGH (ref 0.85–1.16)
MAGNESIUM SERPL-MCNC: 2.3 MG/DL — SIGNIFICANT CHANGE UP (ref 1.6–2.6)
MCHC RBC-ENTMCNC: 29.2 PG — SIGNIFICANT CHANGE UP (ref 27–34)
MCHC RBC-ENTMCNC: 29.7 PG — SIGNIFICANT CHANGE UP (ref 27–34)
MCHC RBC-ENTMCNC: 32.4 G/DL — SIGNIFICANT CHANGE UP (ref 32–36)
MCHC RBC-ENTMCNC: 33.1 G/DL — SIGNIFICANT CHANGE UP (ref 32–36)
MCV RBC AUTO: 89.8 FL — SIGNIFICANT CHANGE UP (ref 80–100)
MCV RBC AUTO: 90.2 FL — SIGNIFICANT CHANGE UP (ref 80–100)
NRBC # BLD: 0 /100 WBCS — SIGNIFICANT CHANGE UP (ref 0–0)
NRBC # BLD: 0 /100 WBCS — SIGNIFICANT CHANGE UP (ref 0–0)
PLATELET # BLD AUTO: 311 K/UL — SIGNIFICANT CHANGE UP (ref 150–400)
PLATELET # BLD AUTO: 359 K/UL — SIGNIFICANT CHANGE UP (ref 150–400)
PROTHROM AB SERPL-ACNC: 15.9 SEC — HIGH (ref 9.9–13.4)
RBC # BLD: 4.88 M/UL — SIGNIFICANT CHANGE UP (ref 3.8–5.2)
RBC # BLD: 5 M/UL — SIGNIFICANT CHANGE UP (ref 3.8–5.2)
RBC # FLD: 14.6 % — HIGH (ref 10.3–14.5)
RBC # FLD: 14.7 % — HIGH (ref 10.3–14.5)
WBC # BLD: 10.79 K/UL — HIGH (ref 3.8–10.5)
WBC # BLD: 8.98 K/UL — SIGNIFICANT CHANGE UP (ref 3.8–10.5)
WBC # FLD AUTO: 10.79 K/UL — HIGH (ref 3.8–10.5)
WBC # FLD AUTO: 8.98 K/UL — SIGNIFICANT CHANGE UP (ref 3.8–10.5)

## 2024-12-24 PROCEDURE — 93460 R&L HRT ART/VENTRICLE ANGIO: CPT | Mod: 26

## 2024-12-24 PROCEDURE — 99152 MOD SED SAME PHYS/QHP 5/>YRS: CPT

## 2024-12-24 PROCEDURE — 99233 SBSQ HOSP IP/OBS HIGH 50: CPT

## 2024-12-24 PROCEDURE — 93312 ECHO TRANSESOPHAGEAL: CPT | Mod: 26

## 2024-12-24 PROCEDURE — 93320 DOPPLER ECHO COMPLETE: CPT | Mod: 26

## 2024-12-24 PROCEDURE — 76376 3D RENDER W/INTRP POSTPROCES: CPT | Mod: 26

## 2024-12-24 RX ORDER — METOPROLOL TARTRATE 50 MG
1 TABLET ORAL
Qty: 30 | Refills: 1
Start: 2024-12-24 | End: 2025-02-21

## 2024-12-24 RX ORDER — APIXABAN 5 MG/1
5 TABLET, FILM COATED ORAL EVERY 12 HOURS
Refills: 0 | Status: DISCONTINUED | OUTPATIENT
Start: 2024-12-24 | End: 2024-12-25

## 2024-12-24 RX ORDER — POTASSIUM CHLORIDE 600 MG/1
20 TABLET, FILM COATED, EXTENDED RELEASE ORAL ONCE
Refills: 0 | Status: COMPLETED | OUTPATIENT
Start: 2024-12-24 | End: 2024-12-24

## 2024-12-24 RX ORDER — APIXABAN 5 MG/1
1 TABLET, FILM COATED ORAL
Qty: 60 | Refills: 0
Start: 2024-12-24 | End: 2025-01-22

## 2024-12-24 RX ORDER — METOPROLOL TARTRATE 50 MG
25 TABLET ORAL DAILY
Refills: 0 | Status: DISCONTINUED | OUTPATIENT
Start: 2024-12-25 | End: 2024-12-25

## 2024-12-24 RX ORDER — SODIUM CHLORIDE 9 MG/ML
1000 INJECTION, SOLUTION INTRAMUSCULAR; INTRAVENOUS; SUBCUTANEOUS
Refills: 0 | Status: DISCONTINUED | OUTPATIENT
Start: 2024-12-24 | End: 2024-12-24

## 2024-12-24 RX ORDER — ROSUVASTATIN 40 MG/1
10 TABLET, FILM COATED ORAL AT BEDTIME
Refills: 0 | Status: DISCONTINUED | OUTPATIENT
Start: 2024-12-24 | End: 2024-12-25

## 2024-12-24 RX ORDER — SACUBITRIL AND VALSARTAN 24; 26 MG/1; MG/1
1 TABLET, FILM COATED ORAL
Qty: 30 | Refills: 0
Start: 2024-12-24 | End: 2025-01-22

## 2024-12-24 RX ADMIN — PANTOPRAZOLE 40 MILLIGRAM(S): 40 TABLET, DELAYED RELEASE ORAL at 06:29

## 2024-12-24 RX ADMIN — APIXABAN 5 MILLIGRAM(S): 5 TABLET, FILM COATED ORAL at 18:12

## 2024-12-24 RX ADMIN — ROSUVASTATIN 10 MILLIGRAM(S): 40 TABLET, FILM COATED ORAL at 21:53

## 2024-12-24 RX ADMIN — CLOPIDOGREL BISULFATE 600 MILLIGRAM(S): 75 TABLET, FILM COATED ORAL at 06:29

## 2024-12-24 RX ADMIN — SODIUM CHLORIDE 50 MILLILITER(S): 9 INJECTION, SOLUTION INTRAMUSCULAR; INTRAVENOUS; SUBCUTANEOUS at 09:15

## 2024-12-24 RX ADMIN — ALPRAZOLAM 1 MILLIGRAM(S): 0.25 TABLET ORAL at 21:54

## 2024-12-24 RX ADMIN — HEPARIN SODIUM 1700 UNIT(S)/HR: 1000 INJECTION, SOLUTION INTRAVENOUS; SUBCUTANEOUS at 07:08

## 2024-12-24 RX ADMIN — LEVOTHYROXINE SODIUM 75 MICROGRAM(S): 175 TABLET ORAL at 05:13

## 2024-12-24 RX ADMIN — CHLORHEXIDINE GLUCONATE 1 APPLICATION(S): 1.2 RINSE ORAL at 05:13

## 2024-12-24 RX ADMIN — SODIUM CHLORIDE 50 MILLILITER(S): 9 INJECTION, SOLUTION INTRAMUSCULAR; INTRAVENOUS; SUBCUTANEOUS at 05:12

## 2024-12-24 RX ADMIN — ROPINIROLE HYDROCHLORIDE 6 MILLIGRAM(S): 1 TABLET, FILM COATED ORAL at 01:05

## 2024-12-24 RX ADMIN — HEPARIN SODIUM 0 UNIT(S)/HR: 1000 INJECTION, SOLUTION INTRAVENOUS; SUBCUTANEOUS at 00:43

## 2024-12-24 RX ADMIN — Medication 81 MILLIGRAM(S): at 12:51

## 2024-12-24 NOTE — DISCHARGE NOTE PROVIDER - NSDCCPTREATMENT_GEN_ALL_CORE_FT
PRINCIPAL PROCEDURE  Procedure: Combined right and left heart cardiac catheterization  Findings and Treatment: 12/24/24: Left Main Minor luminal irregularities; Right Coronary Artery mild diffuse disease; Left Anterior Descending mild diffuse disease; Left Circumflex Artery mild diffuse disease. Right Heart Catheterization pressures: RA 8, RV 31/8, PA 30/17 (21), PCWP 10, Ao 98.2%, PA sat 65.2%, CO/CI 4.0/1.92. Access Right radial artery.      SECONDARY PROCEDURE  Procedure: Echocardiogram, 2D, complete  Findings and Treatment: 12/19/24:  1. Multiple segmental abnormalities exist.    2. Normal left ventricular size.   3. Severely reduced left ventricular systolic function.   4. Regional wall motion abnormalities consistent with ischemic heart disease.   5. Normal right ventricular size.   6. Reduced right ventricular systolic function.   7. Normal atria.   8. Moderate tricuspid regurgitation.   9. Aortic sclerosis without significant stenosis.  10. Pulmonary artery systolic pressure is 37 mmHg.  11. No pericardial effusion.  12. No prior echo is available for comparison.  13. Patient was tachycardic during the study.      Procedure: SHERINE (transesophageal echocardiography)  Findings and Treatment: 12/24/24; followed by Direct current cardioversion (DCCV).   1. Mildly reduced left ventricular systolic function.   2. Mildly reduced right ventricular systolic function.   3. No LA/RA/VIRGIL/RAA thrombus seen.   4. No evidence of an intracardiac shunt.   5. Mild mitral regurgitation.   6. Moderate tricuspid regurgitation.   7. No pericardial effusion.     PRINCIPAL PROCEDURE  Procedure: Combined right and left heart cardiac catheterization  Findings and Treatment: 12/24/24: Left Main Minor luminal irregularities; Right Coronary Artery mild diffuse disease; Left Anterior Descending mild diffuse disease; Left Circumflex Artery mild diffuse disease. Right Heart Catheterization pressures: RA 8, RV 31/8, PA 30/17 (21), PCWP 10, Ao 98.2%, PA sat 65.2%, CO/CI 4.0/1.92. Access Right radial artery.  Your procedure was done through your right wrist. You do not need to keep this area covered and you may shower.  However, do not take baths or go in pools or hottubs for 1 week to prevent infection. Please avoid any heavy lifting  (no more than 3 to 5 lbs) or straining/ strenuous activity for 7 days. If you develop any swelling, bleeding, hardening of the skin (hematoma formation), acute pain, numbness/tingling/weakness in your arm or leg please contact your doctor immediately or call our 24/7 line:  [phone], or call 911.      SECONDARY PROCEDURE  Procedure: Echocardiogram, 2D, complete  Findings and Treatment: 12/19/24:  1. Multiple segmental abnormalities exist.    2. Normal left ventricular size.   3. Severely reduced left ventricular systolic function.   4. Regional wall motion abnormalities consistent with ischemic heart disease.   5. Normal right ventricular size.   6. Reduced right ventricular systolic function.   7. Normal atria.   8. Moderate tricuspid regurgitation.   9. Aortic sclerosis without significant stenosis.  10. Pulmonary artery systolic pressure is 37 mmHg.  11. No pericardial effusion.  12. No prior echo is available for comparison.  13. Patient was tachycardic during the study.      Procedure: SHERINE (transesophageal echocardiography)  Findings and Treatment: 12/24/24; followed by Direct current cardioversion (DCCV).   1. Mildly reduced left ventricular systolic function.   2. Mildly reduced right ventricular systolic function.   3. No LA/RA/VIRGIL/RAA thrombus seen.   4. No evidence of an intracardiac shunt.   5. Mild mitral regurgitation.   6. Moderate tricuspid regurgitation.   7. No pericardial effusion.

## 2024-12-24 NOTE — PROGRESS NOTE ADULT - PROBLEM SELECTOR PLAN 1
Warm and wet, BNP 1600, Trop 80->141->212->143, 95% on room air  - Etiology: TTE c/w Takotsubo per Dr. New  - CXR (12/18/24): Possible small left pleural effusion. No consolidation or pneumothorax.   - TTE (12/19/24): LVEF 23%, RWMA c/w ischemic heart disease (per echo read). mod TR, PASP 37mmHg.  - net negative ~10L  - GDMT: c/w Toprol 50mg qd     o holding valsartan / tyler 2/2 ALANNA  - Diuretics: s/p diuresis, now euvolemic  - Plan for cardiac cath 12/24    ##ALANNA  12/23 Cr 1.59 (previously normal)  - likely 2/2 overdiuresis; dc'd diuretics 12/22  - 500cc bolus given  - pre-cath IVF as above 12/24 AM  - stop valsartan and tyler Elevated Trop iso CHF  - Etiology: TTE c/w Takotsubo per Dr. New  - CXR (12/18/24): Possible small left pleural effusion. No consolidation or pneumothorax.   - TTE (12/19/24): LVEF 23%, RWMA c/w ischemic heart disease (per echo read). mod TR, PASP 37mmHg.  - net negative ~10L  - S/p L/RHC 12/24: LM MLI, RCA mild diffuse disease, LAD mild diffuse disease, Lcx mild diffuse disease. RA 8, RV 31/8, PA 30/17 (21), PCWP 10, Ao 98.2%, PA sat 65.2%, CO/CI 4.0/1.92  - Diuretics: s/p diuresis, now euvolemic  - GDMT: c/w Toprol 25mg qd, start Entresto 24/26mg bid  -core measures, daily weights, strict I/O's

## 2024-12-24 NOTE — DISCHARGE NOTE PROVIDER - NSDCCPCAREPLAN_GEN_ALL_CORE_FT
PRINCIPAL DISCHARGE DIAGNOSIS  Diagnosis: Acute systolic congestive heart failure  Assessment and Plan of Treatment: You have a weak heart, also known as Congestive Heart Failure (CHF). Heart failure is a condition in which the heart does not pump or fill with blood well. As a result, the heart lags behind in its job of moving blood throughout the body. This can lead to symptoms such as swelling, trouble breathing, and feeling tired. Your Ejection Fraction (EF) is 23%, a normal EF is 55-60%  -Please continue _____ to prevent fluid build up in the body.  Please START Toprol 25mg once a day, Entresto 24/26mg twice a day. STOP home medication Amlodipine/benazepril.   -Avoid drinking more than 1.5L of fluid daily and maintain a low salt diet (max 2grams daily).  -Please weigh yourself daily, for any significant increases in daily weight of 2lbs/day or 5lbs/week with associated swelling in the legs or abdomen and/or shortness of breath, please call your doctor or go to the emergency room.  -Follow up with  __ in 1 week.        SECONDARY DISCHARGE DIAGNOSES  Diagnosis: Afib  Assessment and Plan of Treatment: You have an abnormal heart rhythm (arrhythmia) called atrial fibrillation. With this condition, the hearts 2 upper chambers (the atria) quiver rather than squeeze the blood out in a normal pattern. This leads to an irregular and sometimes rapid heartbeat. Atrial fibrillation is serious condition as it affects the heart’s ability to fill with blood as it should and blood clots may form, which increases the risk for stroke.  -Please CONTINUE  ELIQUIS 5MG TWICE A DAY to prevent a stroke.  -Please CONTINUE Toprol 25mg once a day to keep your heart rate controlled  -Please follow up with  ___.      Diagnosis: ALANNA (acute kidney injury)  Assessment and Plan of Treatment: You were found to have elevated kidney function during this admission, however, it improved with IV hydration. Cr___ on discharge. Please follow up with your PCP in next few weeks for continued monitoring and management.    Diagnosis: HTN (hypertension)  Assessment and Plan of Treatment: Please STOP Amlodipine/benazepril and START Toprol 25mg once a day an Entresto 24/26mg twice a day. For blood pressure that is too high or too low please see your doctor or go to the emergency room as necessary.      Diagnosis: HLD (hyperlipidemia)  Assessment and Plan of Treatment: Please continue Zetia 10mg once a day at bedtime to keep your cholesterol low. High cholesterol contributes to heart disease.       PRINCIPAL DISCHARGE DIAGNOSIS  Diagnosis: Acute systolic congestive heart failure  Assessment and Plan of Treatment: You have a weak heart, also known as Congestive Heart Failure (CHF). Heart failure is a condition in which the heart does not pump or fill with blood well. As a result, the heart lags behind in its job of moving blood throughout the body. This can lead to symptoms such as swelling, trouble breathing, and feeling tired. Your Ejection Fraction (EF) is 23%, a normal EF is 55-60%  -Please continue _____ to prevent fluid build up in the body.  Please START Toprol 25mg once a day, Entresto 24/26mg twice a day. STOP home medication Amlodipine/benazepril.   -Avoid drinking more than 1.5L of fluid daily and maintain a low salt diet (max 2grams daily).  -Please weigh yourself daily, for any significant increases in daily weight of 2lbs/day or 5lbs/week with associated swelling in the legs or abdomen and/or shortness of breath, please call your doctor or go to the emergency room.  -Follow up with  __ in 1 week.        SECONDARY DISCHARGE DIAGNOSES  Diagnosis: Afib  Assessment and Plan of Treatment: You have an abnormal heart rhythm (arrhythmia) called atrial fibrillation. With this condition, the hearts 2 upper chambers (the atria) quiver rather than squeeze the blood out in a normal pattern. This leads to an irregular and sometimes rapid heartbeat. Atrial fibrillation is serious condition as it affects the heart’s ability to fill with blood as it should and blood clots may form, which increases the risk for stroke.  -Please CONTINUE  ELIQUIS 5MG TWICE A DAY to prevent a stroke.  -Please CONTINUE Toprol 25mg once a day to keep your heart rate controlled  -Please follow up with  ___.      Diagnosis: ALANNA (acute kidney injury)  Assessment and Plan of Treatment: You were found to have elevated kidney function during this admission, however, it improved with IV hydration. Cr___ on discharge. Please follow up with your PCP in next few weeks for continued monitoring and management.    Diagnosis: HTN (hypertension)  Assessment and Plan of Treatment: Please STOP Amlodipine/benazepril and START Toprol 25mg once a day an Entresto 24/26mg twice a day. For blood pressure that is too high or too low please see your doctor or go to the emergency room as necessary.      Diagnosis: HLD (hyperlipidemia)  Assessment and Plan of Treatment: Please continue Zetia 10mg once a day, Crestor 10mg once a day at bedtime to keep your cholesterol low. High cholesterol contributes to heart disease.       PRINCIPAL DISCHARGE DIAGNOSIS  Diagnosis: Acute systolic congestive heart failure  Assessment and Plan of Treatment: You have a weak heart, also known as Congestive Heart Failure (CHF). Heart failure is a condition in which the heart does not pump or fill with blood well. As a result, the heart lags behind in its job of moving blood throughout the body. This can lead to symptoms such as swelling, trouble breathing, and feeling tired. Your Ejection Fraction (EF) is 23%, a normal EF is 55-60%  -Please continue _____ to prevent fluid build up in the body.  -Please START Toprol 25mg once a day, Entresto 24/26mg twice a day. STOP home medication Amlodipine/benazepril.   -Avoid drinking more than 1.5L of fluid daily and maintain a low salt diet (max 2grams daily).  -Please weigh yourself daily, for any significant increases in daily weight of 2lbs/day or 5lbs/week, and/or chest pain,  swelling in the legs or abdomen, and/or shortness of breath, please call your doctor immediately, dial 911, or go to the emergency room.  You underwent a cardiac catheterization (Results provided for you to show to your cardiologist in [place]) which revealed nonobstructive coronary artery disease, and heart chamber pressures as listed. You underwent a cardiac catheterization (12/24/24); Your procedure was done through your right wrist. You do not need to keep this area covered and you may shower.  However, do not take baths or go in pools or hottubs for 1 week to prevent infection. Please avoid any heavy lifting  (no more than 3 to 5 lbs) or straining/ strenuous activity for 7 days. If you develop any swelling, bleeding, hardening of the skin (hematoma formation), acute pain, numbness/tingling/weakness in your arm or leg please contact your doctor immediately or call our 24/7 line:  [phone], or call 911.   -Please make sure you call tomorrow when offices reopen to make an appointment with your [place] cardiologist within 7 days of discharge. You will need close monitoring and further workup of your heart disease (Cardiomyopathy).      SECONDARY DISCHARGE DIAGNOSES  Diagnosis: Atrial fibrillation with rapid ventricular response  Assessment and Plan of Treatment: You were found to be in atrial fibrillation (an irregular heart rhythm) here in the hospital. With this condition, the hearts 2 upper chambers (the atria) quiver rather than squeeze the blood out in a normal pattern. This leads to an irregular and sometimes rapid heartbeat. Atrial fibrillation is serious condition as it affects the heart’s ability to fill with and pump blood, so the blood can start to form clots. These clots can travel to the brain through the blood vessels, causing a strokes, or to other parts of the body.   -Please CONTINUE  ELIQUIS 5MG TWICE A DAY, a blood thinner, to prevent strokes by helping to prevent clots from forming.  Do not stop taking ELIQUIS without talking to your cardiologist.   -Please CONTINUE  Toprol 25mg once a day to keep your heart rate controlled.  -Please call to establish a [place] [facility] (EP- heart rhythm doctor) for further management.   Due to your risk factors, you need a BLOOD THINNER called Eliquis twice a day. This medication prevents blood clots from AFib. Please look out for DARK OR RED STOOLS, extreme fatigue, altered mental status, extreme weakness, or severe headache, or new visual changes -- if these occur return to ER or call 911.   Return to hospital for above concerning symptoms or any chest pain, palpitations (heart fluttering), shortness of breath, dizziness, feeling like you may faint, or other new concerning symptoms.    Diagnosis: Abnormal thyroid blood test  Assessment and Plan of Treatment: Your thyroid stimulating hormone 5.240- please continue Synthroid/Levothyroxine 75mcg daily but please have your labwork rechecked within 4 weeks for further monitoring.    Diagnosis: ALANNA (acute kidney injury)  Assessment and Plan of Treatment: You were found to have elevated kidney function during this admission, however, it improved with IV hydration. Your kidney function (Creatinine) is normal at 1.11 on day of discharge. Please follow up with your cardiologist and Primary Care for repeat labs within 1-2 weeks for continued monitoring and management.    Diagnosis: HTN (hypertension)  Assessment and Plan of Treatment: Please STOP Amlodipine/benazepril and START Toprol 25mg once a day an Entresto 24/26mg twice a day.   Monitor for any visual changes, headaches or dizziness - if these occur call your doctor right away or call 911/ return to the ER. For blood pressure that is too high or too low please see your doctor or go to the emergency room as necessary. Eat a healthy, Mediterranean, low sodium diet. Monitor blood pressure regularly with a home BP cuff and keep a log for your medical providers.  Follow up with your primary care provider for further management for high blood pressure.       Diagnosis: HLD (hyperlipidemia)  Assessment and Plan of Treatment: Please continue Zetia 10mg once a day, Crestor 10mg once a day at bedtime to keep your cholesterol low. High cholesterol contributes to heart disease.  Continue prescribed medications to control your cholesterol levels and a DASH (Low fat/salt) diet. Follow up with your primary care provider upon discharge for further management and monitoring of cholesterol levels.        PRINCIPAL DISCHARGE DIAGNOSIS  Diagnosis: Acute systolic congestive heart failure  Assessment and Plan of Treatment: You have a weak heart, also known as Congestive Heart Failure (CHF). Heart failure is a condition in which the heart does not pump or fill with blood well. As a result, the heart lags behind in its job of moving blood throughout the body. This can lead to symptoms such as swelling, trouble breathing, and feeling tired. Your Ejection Fraction (EF) is 23%, a normal EF is 55-60%  -Please START Toprol 25mg once a day, Entresto 24/26mg twice a day. STOP home medication Amlodipine/benazepril.   - Today 12/25/24 your standing weight is _______________.  PRN Lasix 20mg QD for weight gain >5Lbs to prevent fluid build up in the body.  -Avoid drinking more than 1.5L of fluid daily and maintain a low salt diet (max 2grams daily).  -Please weigh yourself daily, for any significant increases in daily weight of 2lbs/day or 5lbs/week, and/or chest pain,  swelling in the legs or abdomen, and/or shortness of breath, please call your doctor immediately, dial 911, or go to the emergency room.  You underwent a cardiac catheterization (Results provided for you to show to your cardiologist in [place]) which revealed nonobstructive coronary artery disease, and heart chamber pressures as listed. You underwent a cardiac catheterization (12/24/24).   -Please make sure you call tomorrow when offices reopen to make an appointment with your [place] cardiologist within 7 days of discharge. You will need close monitoring and further workup of your heart disease (Cardiomyopathy).      SECONDARY DISCHARGE DIAGNOSES  Diagnosis: Atrial fibrillation with rapid ventricular response  Assessment and Plan of Treatment: You were found to be in atrial fibrillation (an irregular heart rhythm) here in the hospital. With this condition, the hearts 2 upper chambers (the atria) quiver rather than squeeze the blood out in a normal pattern. This leads to an irregular and sometimes rapid heartbeat. Atrial fibrillation is serious condition as it affects the heart’s ability to fill with and pump blood, so the blood can start to form clots. These clots can travel to the brain through the blood vessels, causing a strokes, or to other parts of the body.   -Please CONTINUE  ELIQUIS 5MG TWICE A DAY, a blood thinner, to prevent strokes by helping to prevent clots from forming.  Do not stop taking ELIQUIS without talking to your cardiologist.   -Please CONTINUE  Toprol 25mg once a day to keep your heart rate controlled.  -Please call to establish a [place] [facility] (EP- heart rhythm doctor) for further management.   Due to your risk factors, you need a BLOOD THINNER called Eliquis twice a day. This medication prevents blood clots from AFib. Please look out for DARK OR RED STOOLS, extreme fatigue, altered mental status, extreme weakness, or severe headache, or new visual changes -- if these occur return to ER or call 911.   Return to hospital for above concerning symptoms or any chest pain, palpitations (heart fluttering), shortness of breath, dizziness, feeling like you may faint, or other new concerning symptoms.    Diagnosis: Abnormal thyroid blood test  Assessment and Plan of Treatment: Your thyroid stimulating hormone 5.240- please continue Synthroid/Levothyroxine 75mcg daily but please have your labwork rechecked within 4 weeks for further monitoring.    Diagnosis: ALANAN (acute kidney injury)  Assessment and Plan of Treatment: You were found to have elevated kidney function during this admission, however, it improved with IV hydration. Your kidney function (Creatinine) is normal at 1.11 on day of discharge. Please follow up with your cardiologist and Primary Care for repeat labs within 1-2 weeks for continued monitoring and management.    Diagnosis: HTN (hypertension)  Assessment and Plan of Treatment: Please STOP Amlodipine/benazepril and START Toprol 25mg once a day an Entresto 24/26mg twice a day.   Monitor for any visual changes, headaches or dizziness - if these occur call your doctor right away or call 911/ return to the ER. For blood pressure that is too high or too low please see your doctor or go to the emergency room as necessary. Eat a healthy, Mediterranean, low sodium diet. Monitor blood pressure regularly with a home BP cuff and keep a log for your medical providers.  Follow up with your primary care provider for further management for high blood pressure.       Diagnosis: HLD (hyperlipidemia)  Assessment and Plan of Treatment: Please continue Zetia 10mg once a day, Crestor 10mg once a day at bedtime to keep your cholesterol low. High cholesterol contributes to heart disease.  Continue prescribed medications to control your cholesterol levels and a DASH (Low fat/salt) diet. Follow up with your primary care provider upon discharge for further management and monitoring of cholesterol levels.        PRINCIPAL DISCHARGE DIAGNOSIS  Diagnosis: Acute systolic congestive heart failure  Assessment and Plan of Treatment: You have a weak heart, also known as Congestive Heart Failure (CHF). Heart failure is a condition in which the heart does not pump or fill with blood well. As a result, the heart lags behind in its job of moving blood throughout the body. This can lead to symptoms such as swelling, trouble breathing, and feeling tired. Your Ejection Fraction (EF) is 23%, a normal EF is 55-60%.  -Please START Toprol (metoprolol succinate ER) 25mg once a day, Entresto 24/26mg twice a day. STOP home medication Amlodipine/benazepril.   - Today 12/25/24 your standing weight is 109.1kg (240.5 pounds); please weigh yourself daily (around the same time each day); and for any day-to-day weight gain of 5pounds, please take one Lasix/Furosemide 20mg pill to prevent fluid build up in the body, and notify your cardiologist. If your weight is within <5lbs from the prior day, do not take Lasix 20mg.   -Avoid drinking more than 1.5L of fluid daily and maintain a low salt diet (max 2grams daily).  -Please weigh yourself daily, for any significant increases in daily weight of  5lbs/day, and/or chest pain,  swelling in the legs or abdomen, and/or shortness of breath, please call your doctor immediately, dial 911, or go to the emergency room.  You underwent a cardiac catheterization (Results provided for you to show to your cardiologist in [place]) which revealed nonobstructive coronary artery disease, and heart chamber pressures as listed.  -Please make sure you call tomorrow when offices reopen to make an appointment with your [place] cardiologist within 7 days of discharge. You will need close monitoring and further workup of your heart disease (Cardiomyopathy).      SECONDARY DISCHARGE DIAGNOSES  Diagnosis: Atrial fibrillation with rapid ventricular response  Assessment and Plan of Treatment: You were found to be in atrial fibrillation (an irregular heart rhythm) here in the hospital. With this condition, the hearts 2 upper chambers (the atria) quiver rather than squeeze the blood out in a normal pattern. This leads to an irregular and sometimes rapid heartbeat. Atrial fibrillation is serious condition as it affects the heart’s ability to fill with and pump blood, so the blood can start to form clots. These clots can travel to the brain through the blood vessels, causing a strokes, or to other parts of the body.   -Please CONTINUE  ELIQUIS (apixaban) 5MG TWICE A DAY, a blood thinner, to prevent strokes by helping to prevent clots from forming.  Do not stop taking ELIQUIS without talking to your cardiologist.   -Please CONTINUE  Toprol 25mg once a day to keep your heart rate controlled.  -Please call to establish a [place] [facility] (EP- heart rhythm doctor) for further management.   Due to your risk factors, you need a BLOOD THINNER called Eliquis twice a day. This medication prevents blood clots from AFib. Please look out for DARK OR RED STOOLS, extreme fatigue, altered mental status, extreme weakness, or severe headache, or new visual changes -- if these occur return to ER or call 911. Please start Pantoprazole/Protonix 40mg daily to protect your stomach.  STOP home Aspirin 81mg daily as now you are on ELIQUIS.   Return to hospital for above concerning symptoms or any chest pain, palpitations (heart fluttering), shortness of breath, dizziness, feeling like you may faint, or other new concerning symptoms.    Diagnosis: Abnormal thyroid blood test  Assessment and Plan of Treatment: Your thyroid stimulating hormone 5.240- please continue Synthroid/Levothyroxine 75mcg daily but please have your labwork rechecked within 4 weeks for further monitoring.    Diagnosis: ALANNA (acute kidney injury)  Assessment and Plan of Treatment: You were found to have elevated kidney function during this admission, however, it improved with IV hydration. Your kidney function (Creatinine) is normal at 1.11 on day of discharge. Please follow up with your cardiologist and Primary Care for repeat labs within 1-2 weeks for continued monitoring and management.    Diagnosis: HTN (hypertension)  Assessment and Plan of Treatment: Please STOP Amlodipine/benazepril and START Toprol 25mg once a day an Entresto 24/26mg twice a day.   Monitor for any visual changes, headaches or dizziness - if these occur call your doctor right away or call 911/ return to the ER. For blood pressure that is too high or too low please see your doctor or go to the emergency room as necessary. Eat a healthy, Mediterranean, low sodium diet. Monitor blood pressure regularly with a home BP cuff and keep a log for your medical providers.  Follow up with your primary care provider for further management for high blood pressure.       Diagnosis: HLD (hyperlipidemia)  Assessment and Plan of Treatment: Please continue Zetia 10mg once a day, Crestor 10mg once a day at bedtime to keep your cholesterol low. High cholesterol contributes to heart disease.  Continue prescribed medications to control your cholesterol levels and a DASH (Low fat/salt) diet. Follow up with your primary care provider upon discharge for further management and monitoring of cholesterol levels.       Diagnosis: At risk for polypharmacy  Assessment and Plan of Treatment: Please STOP Vyvanse until cleared by your cardiologist.  Please discuss with your cardiologist and primary care about your other home medications of Klonopin, Zolpidem, and Percocet as these are all sedating medications and put you at risk for dependency, falls, and other complications. Please use with caution and discuss with your home medical providers.

## 2024-12-24 NOTE — PROGRESS NOTE ADULT - ASSESSMENT
68 yo obese F w/ PMHx HTN, HLD, TIA x2 (~2019, no residual deficits), FHx CAD (mom), hypothyroidism, OA (b/l knee replacement), asthma, anxiety admitted to cardiac tele for HFrEF exacerbation and new onset afib w RVR. Optimizing on GDMT and undergoing IV diuresis. Plan for R/LHC followed by SHERINE/DCCV 12/24. NPO @[facility]    -ASA   III  -Mallampati III  -suitable candidate for moderate sedation  -DAPT: c/w ASA 81mg qd, plavix 600mg x1  -IVF: NS 50cc/2hrs (ordered for 5am 12/24 per Dr Greer)  -cath consent obtained and placed in chart    Risks & benefits of procedure and alternative therapy have been explained to the patient including but not limited to: allergic reaction, bleeding w/possible need for blood transfusion, infection, renal and vascular compromise, limb damage, arrhythmia, stroke, vessel dissection/perforation, Myocardial infarction, emergent CABG.     68 yo obese F w/ PMHx HTN, HLD, TIA x2 (~2019, no residual deficits), hypothyroidism, OA (b/l knee replacement), asthma, anxiety admitted to cardiac tele for HFrEF exacerbation and new onset afib w RVR. Now s/p IV diuresis and optimizing GDMT. Underwent R/LHC as well as successful SHERINE/DCCV on 12/24/24. Patient is resident of [place] and will be dc to [facility] for now. Possible dc in AM 12/25 pending any social/PT needs.

## 2024-12-24 NOTE — PROGRESS NOTE ADULT - NUTRITIONAL ASSESSMENT
This patient has been assessed with a concern for Malnutrition and has been determined to have a diagnosis/diagnoses of Morbid obesity (BMI > 40).    This patient is being managed with:   Diet NPO after Midnight-     NPO Start Date: 23-Dec-2024   NPO Start Time: 23:59  Entered: Dec 23 2024  1:51PM    Diet DASH/TLC-  Sodium & Cholesterol Restricted  1200mL Fluid Restriction ([id number])  Entered: Dec 18 2024  9:05PM

## 2024-12-24 NOTE — DISCHARGE NOTE PROVIDER - NSDCFUADDINST_GEN_ALL_CORE_FT
No operating machinery, driving, or making important decisions until after >24hours anesthesia.   No lifting more than 5lbs, straining, or exercising your Right hand/wrist/arm for 5 days. No baths or pools or hottubs etc for 7 days to prevent infection. If bleeding occurs hold tight pressure and call 911.

## 2024-12-24 NOTE — DISCHARGE NOTE PROVIDER - NSDCMRMEDTOKEN_GEN_ALL_CORE_FT
Aspirin EC 81 mg oral delayed release tablet: 1 tab(s) orally once a day  CeleBREX 400 mg oral capsule: 1 cap(s) orally once a day  clonazePAM 2 mg oral tablet: 1 tab(s) orally once a day as needed for  anxiety  Eliquis 5 mg oral tablet: 1 tab(s) orally 2 times a day  Eliquis 5 mg oral tablet: 1 tab(s) orally 2 times a day  Entresto 24 mg-26 mg oral tablet: 1 tab(s) orally once a day please TEAMS copay to ordering provider - Roseanna Navarro  Percocet 5 mg-325 mg oral tablet: 1 tab(s) orally  rOPINIRole 3 mg oral tablet: 2 tab(s) orally once a day as needed for as needed for restless leg  Synthroid 75 mcg (0.075 mg) oral tablet: 1 tab(s) orally once a day  Toprol-XL 25 mg oral tablet, extended release: 1 tab(s) orally once a day  Vyvanse 70 mg oral capsule: 1 cap(s) orally once a day  zolpidem 5 mg oral tablet: 1 tab(s) orally once a day (at bedtime)   clonazePAM 2 mg oral tablet: 1 tab(s) orally once a day as needed for  anxiety  Eliquis 5 mg oral tablet: 1 tab(s) orally 2 times a day  Entresto 24 mg-26 mg oral tablet: 1 tab(s) orally once a day please TEAMS copay to ordering provider - Roseanna Navarro  Percocet 5 mg-325 mg oral tablet: 1 tab(s) orally  rOPINIRole 3 mg oral tablet: 2 tab(s) orally once a day as needed for as needed for restless leg  Synthroid 75 mcg (0.075 mg) oral tablet: 1 tab(s) orally once a day  Toprol-XL 25 mg oral tablet, extended release: 1 tab(s) orally once a day  zolpidem 5 mg oral tablet: 1 tab(s) orally once a day (at bedtime)   Cardiac Rehabilitation: - We have provided you with a prescription for cardiac rehab which is a medically supervised exercise program for your heart and has been shown to improve the quantity and quality of life of people with heart disease like yours.   - You should attend cardiac rehab 3 times per week for 12 weeks.   - We have provided you with a list of nearby facilities.   -Please call your insurance carrier to determine which of these facilities are covered under your plan.   - Please bring this prescription with you to your follow up appointment with your cardiologist who can then further assist you to enroll into a cardiac rehab program.  clonazePAM 2 mg oral tablet: 1 tab(s) orally once a day as needed for  anxiety  Eliquis 5 mg oral tablet: 1 tab(s) orally 2 times a day  Entresto 24 mg-26 mg oral tablet: 1 tab(s) orally once a day  pantoprazole 40 mg oral delayed release tablet: 1 tab(s) orally once a day (before a meal)  Percocet 5 mg-325 mg oral tablet: 1 tab(s) orally  rOPINIRole 3 mg oral tablet: 2 tab(s) orally once a day as needed for as needed for restless leg  rosuvastatin 10 mg oral tablet: 1 tab(s) orally once a day (at bedtime)  Synthroid 75 mcg (0.075 mg) oral tablet: 1 tab(s) orally once a day  Toprol-XL 25 mg oral tablet, extended release: 1 tab(s) orally once a day  zolpidem 5 mg oral tablet: 1 tab(s) orally once a day (at bedtime)   Cardiac Rehabilitation: - We have provided you with a prescription for cardiac rehab which is a medically supervised exercise program for your heart and has been shown to improve the quantity and quality of life of people with heart disease like yours.   - You should attend cardiac rehab 3 times per week for 12 weeks.   - We have provided you with a list of nearby facilities.   -Please call your insurance carrier to determine which of these facilities are covered under your plan.   - Please bring this prescription with you to your follow up appointment with your cardiologist who can then further assist you to enroll into a cardiac rehab program.  clonazePAM 2 mg oral tablet: 1 tab(s) orally once a day as needed for  anxiety  Eliquis 5 mg oral tablet: 1 tab(s) orally 2 times a day  Entresto 24 mg-26 mg oral tablet: 1 tab(s) orally once a day  ezetimibe 10 mg oral tablet: 1 tab(s) orally once a day (at bedtime)  Lasix 20 mg oral tablet: 1 tab(s) orally once a day as needed for  weight gain in one day of 5lbs or more. Only take this for weight &gt;5lbs in one day and notify your cardiologist.  pantoprazole 40 mg oral delayed release tablet: 1 tab(s) orally once a day (before a meal)  Percocet 5 mg-325 mg oral tablet: 1 tab(s) orally  rOPINIRole 3 mg oral tablet: 2 tab(s) orally once a day as needed for as needed for restless leg  rosuvastatin 10 mg oral tablet: 1 tab(s) orally once a day (at bedtime)  Synthroid 75 mcg (0.075 mg) oral tablet: 1 tab(s) orally once a day  Toprol-XL 25 mg oral tablet, extended release: 1 tab(s) orally once a day  zolpidem 5 mg oral tablet: 1 tab(s) orally once a day (at bedtime)

## 2024-12-24 NOTE — PROGRESS NOTE ADULT - PROBLEM SELECTOR PROBLEM 1
Acute HFrEF (heart failure with reduced ejection fraction)

## 2024-12-24 NOTE — PROGRESS NOTE ADULT - SUBJECTIVE AND OBJECTIVE BOX
Cardiology PA Adult Progress Note    SUBJECTIVE ASSESSMENT:  	  MEDICATIONS:  metoprolol succinate ER 50 milliGRAM(s) Oral daily        ALPRAZolam 1 milliGRAM(s) Oral at bedtime PRN  rOPINIRole 6 milliGRAM(s) Oral daily PRN  zolpidem 5 milliGRAM(s) Oral at bedtime PRN    pantoprazole    Tablet 40 milliGRAM(s) Oral before breakfast    levothyroxine 75 MICROGram(s) Oral daily    aspirin enteric coated 81 milliGRAM(s) Oral daily  heparin   Injectable 9500 Unit(s) IV Push every 6 hours PRN  heparin   Injectable 4500 Unit(s) IV Push every 6 hours PRN  heparin  Infusion.  Unit(s)/Hr IV Continuous <Continuous>  lidocaine   4% Patch 2 Patch Transdermal every 24 hours  sodium chloride 0.9%. 1000 milliLiter(s) IV Continuous <Continuous>    	  VITAL SIGNS:  T(C): 36.7 (12-24-24 @ 05:34), Max: 36.9 (12-23-24 @ 13:28)  HR: 93 (12-24-24 @ 06:36) (84 - 100)  BP: 98/65 (12-24-24 @ 06:36) (87/54 - 122/77)  RR: 20 (12-24-24 @ 06:36) (18 - 21)  SpO2: 96% (12-24-24 @ 06:36) (90% - 96%)  Wt(kg): --    I&O's Summary    23 Dec 2024 07:01  -  24 Dec 2024 07:00  --------------------------------------------------------  IN: 542 mL / OUT: 0 mL / NET: 542 mL                                           PHYSICAL EXAM:  Appearance: Normal	  HEENT: Normal oral mucosa, PERRL, EOMI	  Neck: Supple, + JVD/ - JVD; ___ Carotid Bruit   Cardiovascular: Normal S1 S2, No murmurs  Respiratory: Lungs clear to auscultation/Decreased Breath Sounds/No Rales, Rhonchi, Wheezing	  Gastrointestinal:  Soft, Non-tender, + BS	  Skin: No rashes, No ecchymoses, No cyanosis  Extremities: Normal range of motion, No clubbing, cyanosis or edema  Vascular: Peripheral pulses palpable 2+ bilaterally  Neurologic: Non-focal  Psychiatry: A & O x 3, Mood & affect appropriate    LABS:	 	                                  14.5   8.98  )-----------( 311      ( 24 Dec 2024 05:30 )             43.8     12-23    136  |  92[L]  |  35[H]  ----------------------------<  101[H]  4.0   |  31  |  1.59[H]    Ca    9.7      23 Dec 2024 05:28  Phos  5.0     12-23  Mg     2.3     12-24    TPro  7.4  /  Alb  3.9  /  TBili  0.6  /  DBili  x   /  AST  26  /  ALT  20  /  AlkPhos  133[H]  12-23    proBNP:   Lipid Profile:   HgA1c:   TSH:   PT/INR - ( 24 Dec 2024 05:30 )   PT: 15.9 sec;   INR: 1.36          PTT - ( 24 Dec 2024 05:30 )  PTT:>200.0 sec Cardiology PA Adult Progress Note    SUBJECTIVE ASSESSMENT:  	  Denies chest pain.     MEDICATIONS:  metoprolol succinate ER 50 milliGRAM(s) Oral daily  ALPRAZolam 1 milliGRAM(s) Oral at bedtime PRN  rOPINIRole 6 milliGRAM(s) Oral daily PRN  zolpidem 5 milliGRAM(s) Oral at bedtime PRN  pantoprazole    Tablet 40 milliGRAM(s) Oral before breakfast  levothyroxine 75 MICROGram(s) Oral daily  aspirin enteric coated 81 milliGRAM(s) Oral daily  heparin   Injectable 9500 Unit(s) IV Push every 6 hours PRN  heparin   Injectable 4500 Unit(s) IV Push every 6 hours PRN  heparin  Infusion.  Unit(s)/Hr IV Continuous <Continuous>  lidocaine   4% Patch 2 Patch Transdermal every 24 hours  sodium chloride 0.9%. 1000 milliLiter(s) IV Continuous <Continuous>    	  VITAL SIGNS:  T(C): 36.7 (12-24-24 @ 05:34), Max: 36.9 (12-23-24 @ 13:28)  HR: 93 (12-24-24 @ 06:36) (84 - 100)  BP: 98/65 (12-24-24 @ 06:36) (87/54 - 122/77)  RR: 20 (12-24-24 @ 06:36) (18 - 21)  SpO2: 96% (12-24-24 @ 06:36) (90% - 96%)  Wt(kg): --    I&O's Summary    23 Dec 2024 07:01  -  24 Dec 2024 07:00  --------------------------------------------------------  IN: 542 mL / OUT: 0 mL / NET: 542 mL                                           PHYSICAL EXAM:  Appearance: obese, without any acute distress  HEENT: EOMI	  Neck: Supple   Cardiovascular: Normal S1 S2, No murmurs  Respiratory: Lungs clear to auscultation/Decreased Breath Sounds/No Rales, Rhonchi, Wheezing	  Gastrointestinal:  Soft, Non-tender	  Skin: No rashes, No ecchymoses, No cyanosis  Extremities: No significant LE pitting edema bilaterally  Neurologic: Non-focal  Psychiatry: A & O x 3, Mood & affect appropriate    LABS:	 	                                  14.5   8.98  )-----------( 311      ( 24 Dec 2024 05:30 )             43.8     12-23    136  |  92[L]  |  35[H]  ----------------------------<  101[H]  4.0   |  31  |  1.59[H]    Ca    9.7      23 Dec 2024 05:28  Phos  5.0     12-23  Mg     2.3     12-24    TPro  7.4  /  Alb  3.9  /  TBili  0.6  /  DBili  x   /  AST  26  /  ALT  20  /  AlkPhos  133[H]  12-23      PT/INR - ( 24 Dec 2024 05:30 )   PT: 15.9 sec;   INR: 1.36          PTT - ( 24 Dec 2024 05:30 )  PTT:>200.0 sec

## 2024-12-24 NOTE — PROGRESS NOTE ADULT - PROBLEM SELECTOR PLAN 3
TSH 5.240  - C/w home synthroid 75mcg ##ALANNA  12/23 Cr 1.59 (previously normal)  - likely 2/2 overdiuresis; dc'd diuretics 12/22  - 500cc bolus given  - pre-cath IVF as above 12/24 AM  - stop valsartan and tyler

## 2024-12-24 NOTE — DISCHARGE NOTE PROVIDER - DETAILS OF MALNUTRITION DIAGNOSIS/DIAGNOSES
This patient has been assessed with a concern for Malnutrition and was treated during this hospitalization for the following Nutrition diagnosis/diagnoses:     -  12/23/2024: Morbid obesity (BMI > 40)

## 2024-12-24 NOTE — PROGRESS NOTE ADULT - PROBLEM SELECTOR PLAN 2
p/w  new onset afib rates now in 100s  - Rate Control: Toprol 50mg qd  - AC: hold eliquis for LHC, start heparin gtt  - plan for SHERINE/DCCV 12/24 after R/LHC p/w  new onset afib rates in 100s  -now s/p successful SHERINE/DCCV 12/24  -Rate control: reduce Toprol 25mg qd  - AC: restart Eliquis 5mg bid

## 2024-12-24 NOTE — DISCHARGE NOTE PROVIDER - HOSPITAL COURSE
***INCOMPLETE***      70yo obese F with PMHx HTN, HLD, TIA x2 (~2019, no residual deficits), hypothyroidism, OA (b/l knee replacement), asthma, anxiety presented to [facility] ED with TANNER with findings of CHF exacerbation and new onset afib RVR. Patient wis visiting [place] with  and primary residence is [place] otherwise.     In ER, -130s; BP trending 100s/60s; afebrile. Placed on 2LNC for comfort. EKG showing afib RVR rates 120s. CXR w/ vascular congestion. Labs s/f Alk Phos 129, AST 53, trop 80, BNP 1600. S/p lasix 40mg IVP x1, lopressor 5mg IVP x1 in ER. Rates improved to 90s s/p lopressor. Admitted to cardiac tele for CHF exacerbation and afib RVR.    Patient is now s/p IV Diuresis. Diuresis held iso ALANNA previously. Patient remains euvolemic. Diuretic in discharge ______?     Patient underwent L/RHC 12/24: LM MLI, RCA mild diffuse disease, LAD mild diffuse disease, Lcx mild diffuse disease. RA 8, RV 31/8, PA 30/17 (21), PCWP 10, Ao 98.2%, PA sat 65.2%, CO/CI 4.0/1.92.    After cath, patient underwent successful SHERINE/DCCV 12/24/24. EP advised to reduce Toprol 25mg qd. Patient will continue Eliquis 5mg bid.     GDMT on discharge: Toprol 25mg qd, Entresto 24/26mg bid    Patient is a local resident in [place] and will be discharged to [facility] for now. Patient plans to take a flight back to [place] on Thursday 12/26/24. Patient will establish care with cardiologist and EP team in [place].     Pt seen and examined at bedside this AM without any complaints or events overnight, VSS, labs and telemetry reviewed and pt stable for discharge as discussed with Dr. Greer. Pt has received appropriate discharge instructions, including medication regimen, access site management and follow up with  __ in 1-2 weeks.             68yo obese F with PMHx HTN, HLD, TIA x2 (~2019, no residual deficits), hypothyroidism, OA (b/l knee replacement), asthma, anxiety presented to [facility] ED with TANNER with findings of CHF exacerbation and new onset AFib RVR. Patient is visiting [place] with  and primary residence is [place] otherwise.     In ER, -130s; BP trending 100s/60s; afebrile. Placed on 2LNC for comfort. EKG showing AFib RVR rates 120s. CXR w/ vascular congestion. Labs s/f Alk Phos 129, AST 53, trop 80, BNP 1600. S/p Lasix 40mg IVP x1, lopressor 5mg IVP x1 in ER. Rates improved to 90s s/p lopressor. Admitted to cardiac tele for CHF exacerbation and AFib RVR.      Acute HFrEF (heart failure with reduced ejection fraction).   - Elevated Trop iso CHF  - Etiology: TTE c/w Takotsubo d/w MD  - CXR (12/18/24): Possible small left pleural effusion. No consolidation or pneumothorax.   - TTE (12/19/24): LVEF 23%, RWMA c/w ischemic heart disease (per echo read). mod TR, PASP 37mmHg.  - net negative ~10L  - S/p L/RHC 12/24/24: LM Minor luminal irregularities, RCA mild diffuse disease, LAD mild diffuse disease, Lcx mild diffuse disease. RA 8, RV 31/8, PA 30/17 (21), PCWP 10, Ao 98.2%, PA sat 65.2%, CO/CI 4.0/1.92. Access Right radial artery. [  IC Calos Gordon / Nicol ]   - Diuretics: s/p diuresis, now euvolemic; Diuresis held iso ALANNA previously. Patient remains euvolemic. Diuretic in discharge ______?   - GDMT on discharge: Toprol 25mg qd, Entresto 24/26mg bid  -core measures, daily weights, strict I/O's.    New Onset AFib: After cath, patient underwent successful SHERINE/DCCV 12/24/24. EP advised to reduce Toprol 25mg qd. Patient will continue Eliquis 5mg bid.     Patient is a local resident in [place] and will be discharged to \[facility]\"" for now. Patient plans to take a flight back to [place] on Thursday 12/26/24. Patient will establish care with cardiologist and EP team in [place].     Patient was admitted to [address] [place] [address] overnight for observation post-procedure. Today, pt was seen and examined at bedside;  pt denies complaints of chest pain, dizziness, shortness of breath, palpitations, pain, LE edema, fever, chills, N/V, bleeding/bruising from access site.  Pt was able to void and ambulate without difficulty.  Right radial access site soft, no bleeding or swelling at site, radial/DP/PT pulses 2+,  at baseline. VSS, Labs and Telemetry reviewed and unremarkable/stable. Physical exam stable. Pt was seen and examined by cardiology attending as well and is deemed stable for discharge per Dr. Greer. Pt has received appropriate discharge instructions, including medication regimen, access site management and to follow up with a cardiologist in [place] (pt and  plan to call office tomorrow 12/26/24 when offices reopen to schedule 7-day appointment).. All new medications or medications needing refills were e-prescribed to pt’s preferred pharmacy.    Dx: Heart Failure this Admit, History of Heart Failure, Unstable Angina/ACS/NSTEMI/STEMI: YES            If YES: 7 day Follow-Up Appointment Made: No due to returning home to [place] and calling her 's [place] cardiologist's office tomorrow 12/26 to establish care within 7 days.           Cardiac Rehab (STEMI/NSTEMI/ACS/Unstable Angina/CHF):            *Education on benefits of Cardiac Rehab provided to patient: Yes         *Referral and Prescription Given for Cardiac Rehab : Yes         *Pt given list of locations & instructed to contact their insurance company to review list of participating providers      GLP-1 receptor agonist/SGLT2 inhibitor meds discussed w/ patients and encouraged to discuss further with PMD or Endocrinologist at next visit.    Pt discharge copies detail cardiovascular history, medications, testing/treatments, OR patient has created a patient portal account and instructed to provide their records at their 1st appointment.               70yo obese F with PMHx HTN, HLD, TIA x2 (~2019, no residual deficits), hypothyroidism, OA (b/l knee replacement), asthma, anxiety presented to [facility] ED with TANNER with findings of CHF exacerbation and new onset AFib RVR. Patient is visiting [place] with  and primary residence is [place] otherwise.     In ER, -130s; BP trending 100s/60s; afebrile. Placed on 2LNC for comfort. EKG showing AFib RVR rates 120s. CXR w/ vascular congestion. Labs s/f Alk Phos 129, AST 53, trop 80, BNP 1600. S/p Lasix 40mg IVP x1, lopressor 5mg IVP x1 in ER. Rates improved to 90s s/p lopressor. Admitted to cardiac tele for CHF exacerbation and AFib RVR.      Acute HFrEF (heart failure with reduced ejection fraction).   - Elevated Trop iso CHF  - Etiology: TTE c/w Takotsubo d/w MD  - CXR (12/18/24): Possible small left pleural effusion. No consolidation or pneumothorax.   - TTE (12/19/24): LVEF 23%, RWMA c/w ischemic heart disease (per echo read). mod TR, PASP 37mmHg.  - net negative ~10L  - S/p L/RHC 12/24/24: LM Minor luminal irregularities, RCA mild diffuse disease, LAD mild diffuse disease, Lcx mild diffuse disease. RA 8, RV 31/8, PA 30/17 (21), PCWP 10, Ao 98.2%, PA sat 65.2%, CO/CI 4.0/1.92. Access Right radial artery. [  IC Calos Gordon / Nicol ]   - Diuretics: s/p diuresis, now euvolemic; Diuresis held iso ALANNA previously. Patient remains euvolemic. Diuretic in discharge is PRN Lasix 20mg QD for weight gain >5Lbs as d/w Dr. Greer  - GDMT on discharge: Toprol 25mg qd, Entresto 24/26mg bid  -core measures, daily weights, strict I/O's.    New Onset AFib: After cath, patient underwent successful SHERINE/DCCV 12/24/24. EP advised to reduce Toprol 25mg qd. Patient will continue Eliquis 5mg bid.     Patient is a local resident in [place] and will be discharged to [facility] for now. Patient plans to take a flight back to [place] on Thursday 12/26/24. Patient will establish care with cardiologist and EP team in [place].     Patient was admitted to [address] [place] [address] overnight for observation post-procedure. Today, pt was seen and examined at bedside;  pt denies complaints of chest pain, dizziness, shortness of breath, palpitations, pain, LE edema, fever, chills, N/V, bleeding/bruising from access site.  Pt was able to void and ambulate without difficulty.  Right radial access site soft, no bleeding or swelling at site, radial/DP/PT pulses 2+,  at baseline. VSS, Labs and Telemetry reviewed and unremarkable/stable. Physical exam stable. Pt was seen and examined by cardiology attending as well and is deemed stable for discharge per Dr. Greer. Pt has received appropriate discharge instructions, including medication regimen, access site management and to follow up with a cardiologist in [place] (pt and  plan to call office tomorrow 12/26/24 when offices reopen to schedule 7-day appointment).. All new medications or medications needing refills were e-prescribed to pt’s preferred pharmacy.    Dx: Heart Failure this Admit, History of Heart Failure, Unstable Angina/ACS/NSTEMI/STEMI: YES            If YES: 7 day Follow-Up Appointment Made: No due to returning home to [place] and calling her 's [place] cardiologist's office tomorrow 12/26 to establish care within 7 days.           Cardiac Rehab (STEMI/NSTEMI/ACS/Unstable Angina/CHF):            *Education on benefits of Cardiac Rehab provided to patient: Yes         *Referral and Prescription Given for Cardiac Rehab : Yes         *Pt given list of locations & instructed to contact their insurance company to review list of participating providers      GLP-1 receptor agonist/SGLT2 inhibitor meds discussed w/ patients and encouraged to discuss further with PMD or Endocrinologist at next visit.    Pt discharge copies detail cardiovascular history, medications, testing/treatments, OR patient has created a patient portal account and instructed to provide their records at their 1st appointment.               70yo obese F with PMHx HTN, HLD, TIA x2 (~2019, no residual deficits), hypothyroidism, OA (b/l knee replacement), asthma, anxiety presented to [facility] ED with TANNER with findings of CHF exacerbation and new onset AFib RVR. Patient is visiting [place] with  and primary residence is [place] otherwise.     In ER, -130s; BP trending 100s/60s; afebrile. Placed on 2LNC for comfort. EKG showing AFib RVR rates 120s. CXR w/ vascular congestion. Labs s/f Alk Phos 129, AST 53, trop 80, BNP 1600. S/p Lasix 40mg IVP x1, lopressor 5mg IVP x1 in ER. Rates improved to 90s s/p lopressor. Admitted to cardiac tele for CHF exacerbation and AFib RVR.      Acute HFrEF (heart failure with reduced ejection fraction).   - Elevated Trop iso CHF  - Etiology: TTE c/w Takotsubo d/w MD  - CXR (12/18/24): Possible small left pleural effusion. No consolidation or pneumothorax.   - TTE (12/19/24): LVEF 23%, RWMA c/w ischemic heart disease (per echo read). mod TR, PASP 37mmHg.  - net negative ~10L  - S/p L/RHC 12/24/24: LM Minor luminal irregularities, RCA mild diffuse disease, LAD mild diffuse disease, Lcx mild diffuse disease. RA 8, RV 31/8, PA 30/17 (21), PCWP 10, Ao 98.2%, PA sat 65.2%, CO/CI 4.0/1.92. Access Right radial artery. [  IC Calos Gordon / Nicol ]   - Diuretics: s/p diuresis, now euvolemic; Diuresis held iso ALANNA previously. Patient remains euvolemic. Diuretic in discharge is PRN Lasix 20mg QD for weight gain >5Lbs as d/w Dr. Greer  - GDMT on discharge: Toprol 25mg qd, Entresto 24/26mg bid  -core measures, daily weights, strict I/O's.    New Onset AFib: After cath, patient underwent successful SHERINE/DCCV 12/24/24. EP advised to reduce Toprol 25mg qd. Patient will continue Eliquis 5mg bid and Pantoprazole. Pt will stop home aspirin.     Patient is a local resident in [place] and will be discharged to [facility] for now. Patient plans to take a flight back to [place] on Thursday 12/26/24. Patient will establish care with cardiologist and EP team in [place].     Patient was admitted to [address] [place] [address] overnight for observation post-procedure. Today, pt was seen and examined at bedside;  pt denies complaints of chest pain, dizziness, shortness of breath, palpitations, pain, LE edema, fever, chills, N/V, bleeding/bruising from access site.  Pt was able to void and ambulate without difficulty.  Right radial access site soft, no bleeding or swelling at site, radial/DP/PT pulses 2+,  at baseline. VSS, Labs and Telemetry reviewed and unremarkable/stable. Physical exam stable. Pt was seen and examined by cardiology attending as well and is deemed stable for discharge per Dr. Greer. Pt has received appropriate discharge instructions, including medication regimen, access site management and to follow up with a cardiologist in [place] (pt and  plan to call office tomorrow 12/26/24 when offices reopen to schedule 7-day appointment).. All new medications or medications needing refills were e-prescribed to pt’s preferred pharmacy.    Dx: Heart Failure this Admit, History of Heart Failure, Unstable Angina/ACS/NSTEMI/STEMI: YES            If YES: 7 day Follow-Up Appointment Made: No due to returning home to [place] and calling her 's [place] cardiologist's office tomorrow 12/26 to establish care within 7 days.           Cardiac Rehab (STEMI/NSTEMI/ACS/Unstable Angina/CHF):            *Education on benefits of Cardiac Rehab provided to patient: Yes         *Referral and Prescription Given for Cardiac Rehab : Yes         *Pt given list of locations & instructed to contact their insurance company to review list of participating providers      GLP-1 receptor agonist/SGLT2 inhibitor meds discussed w/ patients and encouraged to discuss further with PMD or Endocrinologist at next visit.    Pt discharge copies detail cardiovascular history, medications, testing/treatments, OR patient has created a patient portal account and instructed to provide their records at their 1st appointment.

## 2024-12-24 NOTE — PROGRESS NOTE ADULT - PROBLEM SELECTOR PLAN 5
- C/w home Ropinirole 3mg 2 tabs PRN    ##anxiety  - home med clonazepam 2mg; start Xanax 1mg prn qhs    F: None  E: Replete if K<4 or Mag<2  N: DASH Diet  VTEppx: Eliquis  GIppx: Protonix  Dispo: potential dc in AM 12/25 pending any social work/PT needs

## 2024-12-24 NOTE — PROGRESS NOTE ADULT - NS ATTEND AMEND GEN_ALL_CORE FT
70yo obese F ++FHx CAD (mom), PMHx HTN, HLD, TIA x2 (~2019, no residual deficits), hypothyroidism, OA (b/l knee replacement), asthma, anxiety presented to [facility] ED c/o worsening TANNER and b/l LE edema x1mo. States she can only go 5 steps without losing her breath. Denies hx CHF or afib. Pt is visiting from [place] with her .    1. AF RVR  New onset AF with RVR. Supratherapeutic on heparin drip, switch to eliquis.  Continue lopressor.    2. Acute HF  New onset HF, stage C, echo reviewed, consistent with Takotsubo.   Lasix 80 mg IV, lopressor 25 mg bid. add aldactone.    3. NSTEMI type 2  Mild elevation of troponin outside of proportion of LV dysfunction suggestive of type 2, chest pain free. Once euvolemic and HR under control, CCTA.    During non face-to-face time, I reviewed relevant portions of the patient’s medical record; including vitals, labs, medications, cardiac studies, remaining additional imaging and consultant recommendations. During face-to-face time, I took a relevant history and examined the patient. I also explained to the patient their diagnoses, and specific cardiopulmonary management plan, which required a high level of medical decision making.  I answered all questions related to the patient's medical conditions.
68 yo obese F w/ PMHx HTN, HLD, TIA x2 (~2019, no residual deficits), FHx CAD (mom), hypothyroidism, OA (b/l knee replacement), asthma, anxiety admitted to cardiac tele for HFrEF exacerbation and new onset afib w RVR.     - non obs CAD on cath  - start entresto 24/26 bid  - plan on DCCV cardioversion  - will need a cardiac MRI can be done as outpatient
68 yo obese F w/ PMHx HTN, HLD, TIA x2 (~2019, no residual deficits), FHx CAD (mom), hypothyroidism, OA (b/l knee replacement), asthma, anxiety admitted to cardiac tele for HFrEF exacerbation and new onset afib w RVR. Optimizing on GDMT and undergoing IV diuresis. Plan for R/LHC followed by SHERINE/DCCV 12/24.     - stop diuretics  - give 500 cc IV fluid  - plan for RHC and LHC   - then SHERINE cardioversion
68yo obese F ++FHx CAD (mom), PMHx HTN, HLD, TIA x2 (~2019, no residual deficits), hypothyroidism, OA (b/l knee replacement), asthma, anxiety presented to [facility] ED c/o worsening TANNER and b/l LE edema x1mo. States she can only go 5 steps without losing her breath. Denies hx CHF or afib. Pt is visiting from [place] with her .    1. AF RVR  New onset AF with RVR, now HR improved, but remains on AF.  Continue eliquis and lopressor.  SHERINE (complete including wall motion) on Monday and DCCV.    2. Acute HF  New onset HF, stage C, echo reviewed, consistent with Takotsubo.   Lasix 80 mg IV, lopressor 25 mg bid and aldactone.  CCTA on monday to rule out CAD.    3. NSTEMI type 2  Mild elevation of troponin outside of proportion of LV dysfunction suggestive of type 2, chest pain free. Once euvolemic and HR under control, CCTA.    During non face-to-face time, I reviewed relevant portions of the patient’s medical record; including vitals, labs, medications, cardiac studies, remaining additional imaging and consultant recommendations. During face-to-face time, I took a relevant history and examined the patient. I also explained to the patient their diagnoses, and specific cardiopulmonary management plan, which required a high level of medical decision making.  I answered all questions related to the patient's medical conditions.

## 2024-12-24 NOTE — DISCHARGE NOTE PROVIDER - PROVIDER TOKENS
FREE:[LAST:[Establish cardiologist for appointment within 7 days],PHONE:[(   )    -],FAX:[(   )    -]]

## 2024-12-24 NOTE — PROGRESS NOTE ADULT - PROBLEM SELECTOR PLAN 4
- C/w home Ropinirole 3mg 2 tabs PRN    ##anxiety  - home med clonazepam 2mg; start Xanax 1mg prn qhs    F: None  E: Replete if K<4 or Mag<2  N: DASH Diet  VTEppx: Eliquis  GIppx: Protonix  Dispo: medically active TSH 5.240  - C/w home synthroid 75mcg

## 2024-12-24 NOTE — DISCHARGE NOTE PROVIDER - CARE PROVIDER_API CALL
Establish cardiologist for appointment within 7 days,   Phone: (   )    -  Fax: (   )    -  Follow Up Time:

## 2024-12-25 VITALS — TEMPERATURE: 98 F

## 2024-12-25 LAB
ALBUMIN SERPL ELPH-MCNC: 4.1 G/DL — SIGNIFICANT CHANGE UP (ref 3.3–5)
ALP SERPL-CCNC: 125 U/L — HIGH (ref 40–120)
ALT FLD-CCNC: 21 U/L — SIGNIFICANT CHANGE UP (ref 10–45)
ANION GAP SERPL CALC-SCNC: 10 MMOL/L — SIGNIFICANT CHANGE UP (ref 5–17)
APTT BLD: 31.6 SEC — SIGNIFICANT CHANGE UP (ref 24.5–35.6)
AST SERPL-CCNC: 22 U/L — SIGNIFICANT CHANGE UP (ref 10–40)
BILIRUB SERPL-MCNC: 0.7 MG/DL — SIGNIFICANT CHANGE UP (ref 0.2–1.2)
BUN SERPL-MCNC: 34 MG/DL — HIGH (ref 7–23)
CALCIUM SERPL-MCNC: 9.6 MG/DL — SIGNIFICANT CHANGE UP (ref 8.4–10.5)
CHLORIDE SERPL-SCNC: 95 MMOL/L — LOW (ref 96–108)
CO2 SERPL-SCNC: 30 MMOL/L — SIGNIFICANT CHANGE UP (ref 22–31)
CREAT SERPL-MCNC: 1.11 MG/DL — SIGNIFICANT CHANGE UP (ref 0.5–1.3)
EGFR: 53 ML/MIN/1.73M2 — LOW
GLUCOSE SERPL-MCNC: 95 MG/DL — SIGNIFICANT CHANGE UP (ref 70–99)
HCT VFR BLD CALC: 44.5 % — SIGNIFICANT CHANGE UP (ref 34.5–45)
HGB BLD-MCNC: 14.2 G/DL — SIGNIFICANT CHANGE UP (ref 11.5–15.5)
INR BLD: 1.16 — SIGNIFICANT CHANGE UP (ref 0.85–1.16)
MAGNESIUM SERPL-MCNC: 2.3 MG/DL — SIGNIFICANT CHANGE UP (ref 1.6–2.6)
MCHC RBC-ENTMCNC: 29 PG — SIGNIFICANT CHANGE UP (ref 27–34)
MCHC RBC-ENTMCNC: 31.9 G/DL — LOW (ref 32–36)
MCV RBC AUTO: 90.8 FL — SIGNIFICANT CHANGE UP (ref 80–100)
NRBC # BLD: 0 /100 WBCS — SIGNIFICANT CHANGE UP (ref 0–0)
PLATELET # BLD AUTO: 333 K/UL — SIGNIFICANT CHANGE UP (ref 150–400)
POTASSIUM SERPL-MCNC: 3.8 MMOL/L — SIGNIFICANT CHANGE UP (ref 3.5–5.3)
POTASSIUM SERPL-SCNC: 3.8 MMOL/L — SIGNIFICANT CHANGE UP (ref 3.5–5.3)
PROT SERPL-MCNC: 7.6 G/DL — SIGNIFICANT CHANGE UP (ref 6–8.3)
PROTHROM AB SERPL-ACNC: 13.6 SEC — HIGH (ref 9.9–13.4)
RBC # BLD: 4.9 M/UL — SIGNIFICANT CHANGE UP (ref 3.8–5.2)
RBC # FLD: 14.7 % — HIGH (ref 10.3–14.5)
SODIUM SERPL-SCNC: 135 MMOL/L — SIGNIFICANT CHANGE UP (ref 135–145)
WBC # BLD: 8.73 K/UL — SIGNIFICANT CHANGE UP (ref 3.8–10.5)
WBC # FLD AUTO: 8.73 K/UL — SIGNIFICANT CHANGE UP (ref 3.8–10.5)

## 2024-12-25 PROCEDURE — 86900 BLOOD TYPING SEROLOGIC ABO: CPT

## 2024-12-25 PROCEDURE — 80061 LIPID PANEL: CPT

## 2024-12-25 PROCEDURE — 86850 RBC ANTIBODY SCREEN: CPT

## 2024-12-25 PROCEDURE — 99285 EMERGENCY DEPT VISIT HI MDM: CPT

## 2024-12-25 PROCEDURE — 83036 HEMOGLOBIN GLYCOSYLATED A1C: CPT

## 2024-12-25 PROCEDURE — 82550 ASSAY OF CK (CPK): CPT

## 2024-12-25 PROCEDURE — 84100 ASSAY OF PHOSPHORUS: CPT

## 2024-12-25 PROCEDURE — C1894: CPT

## 2024-12-25 PROCEDURE — 83880 ASSAY OF NATRIURETIC PEPTIDE: CPT

## 2024-12-25 PROCEDURE — 84484 ASSAY OF TROPONIN QUANT: CPT

## 2024-12-25 PROCEDURE — 36415 COLL VENOUS BLD VENIPUNCTURE: CPT

## 2024-12-25 PROCEDURE — C8929: CPT

## 2024-12-25 PROCEDURE — 83605 ASSAY OF LACTIC ACID: CPT

## 2024-12-25 PROCEDURE — C1769: CPT

## 2024-12-25 PROCEDURE — 71045 X-RAY EXAM CHEST 1 VIEW: CPT

## 2024-12-25 PROCEDURE — 85730 THROMBOPLASTIN TIME PARTIAL: CPT

## 2024-12-25 PROCEDURE — C1887: CPT

## 2024-12-25 PROCEDURE — 83735 ASSAY OF MAGNESIUM: CPT

## 2024-12-25 PROCEDURE — 80048 BASIC METABOLIC PNL TOTAL CA: CPT

## 2024-12-25 PROCEDURE — 86901 BLOOD TYPING SEROLOGIC RH(D): CPT

## 2024-12-25 PROCEDURE — 96374 THER/PROPH/DIAG INJ IV PUSH: CPT

## 2024-12-25 PROCEDURE — 85027 COMPLETE CBC AUTOMATED: CPT

## 2024-12-25 PROCEDURE — 85610 PROTHROMBIN TIME: CPT

## 2024-12-25 PROCEDURE — 84443 ASSAY THYROID STIM HORMONE: CPT

## 2024-12-25 PROCEDURE — 85025 COMPLETE CBC W/AUTO DIFF WBC: CPT

## 2024-12-25 PROCEDURE — 99239 HOSP IP/OBS DSCHRG MGMT >30: CPT

## 2024-12-25 PROCEDURE — 82803 BLOOD GASES ANY COMBINATION: CPT

## 2024-12-25 PROCEDURE — C8925: CPT

## 2024-12-25 PROCEDURE — 82553 CREATINE MB FRACTION: CPT

## 2024-12-25 PROCEDURE — 80053 COMPREHEN METABOLIC PANEL: CPT

## 2024-12-25 PROCEDURE — 85347 COAGULATION TIME ACTIVATED: CPT

## 2024-12-25 RX ORDER — AMLODIPINE BESYLATE AND BENAZEPRIL HYDROCHLORIDE 5; 20 MG/1; MG/1
1 CAPSULE ORAL
Refills: 0 | DISCHARGE

## 2024-12-25 RX ORDER — LISDEXAMFETAMINE DIMESYLATE 20 MG/1
1 CAPSULE ORAL
Refills: 0 | DISCHARGE

## 2024-12-25 RX ORDER — POTASSIUM CHLORIDE 600 MG/1
20 TABLET, FILM COATED, EXTENDED RELEASE ORAL ONCE
Refills: 0 | Status: COMPLETED | OUTPATIENT
Start: 2024-12-25 | End: 2024-12-25

## 2024-12-25 RX ORDER — SACUBITRIL AND VALSARTAN 24; 26 MG/1; MG/1
1 TABLET, FILM COATED ORAL
Refills: 0 | Status: DISCONTINUED | OUTPATIENT
Start: 2024-12-25 | End: 2024-12-25

## 2024-12-25 RX ORDER — ROSUVASTATIN 40 MG/1
1 TABLET, FILM COATED ORAL
Qty: 30 | Refills: 0
Start: 2024-12-25 | End: 2025-01-23

## 2024-12-25 RX ORDER — EZETIMIBE 10 MG/1
1 TABLET ORAL
Qty: 30 | Refills: 0
Start: 2024-12-25 | End: 2025-01-23

## 2024-12-25 RX ORDER — CELECOXIB 200 MG
1 CAPSULE ORAL
Refills: 0 | DISCHARGE

## 2024-12-25 RX ORDER — FUROSEMIDE 20 MG
1 TABLET ORAL
Qty: 30 | Refills: 0
Start: 2024-12-25 | End: 2025-01-23

## 2024-12-25 RX ORDER — EZETIMIBE 10 MG/1
1 TABLET ORAL
Refills: 0 | DISCHARGE

## 2024-12-25 RX ORDER — PANTOPRAZOLE 40 MG/1
1 TABLET, DELAYED RELEASE ORAL
Qty: 30 | Refills: 0
Start: 2024-12-25 | End: 2025-01-23

## 2024-12-25 RX ORDER — ASPIRIN 81 MG
1 TABLET, DELAYED RELEASE (ENTERIC COATED) ORAL
Refills: 0 | DISCHARGE

## 2024-12-25 RX ORDER — SACUBITRIL AND VALSARTAN 24; 26 MG/1; MG/1
1 TABLET, FILM COATED ORAL
Qty: 30 | Refills: 0
Start: 2024-12-25 | End: 2025-01-23

## 2024-12-25 RX ADMIN — Medication 81 MILLIGRAM(S): at 11:48

## 2024-12-25 RX ADMIN — PANTOPRAZOLE 40 MILLIGRAM(S): 40 TABLET, DELAYED RELEASE ORAL at 05:47

## 2024-12-25 RX ADMIN — SACUBITRIL AND VALSARTAN 1 TABLET(S): 24; 26 TABLET, FILM COATED ORAL at 11:48

## 2024-12-25 RX ADMIN — APIXABAN 5 MILLIGRAM(S): 5 TABLET, FILM COATED ORAL at 05:44

## 2024-12-25 RX ADMIN — Medication 25 MILLIGRAM(S): at 05:44

## 2024-12-25 RX ADMIN — LEVOTHYROXINE SODIUM 75 MICROGRAM(S): 175 TABLET ORAL at 05:44

## 2024-12-25 RX ADMIN — POTASSIUM CHLORIDE 20 MILLIEQUIVALENT(S): 600 TABLET, FILM COATED, EXTENDED RELEASE ORAL at 08:59

## 2024-12-25 RX ADMIN — Medication 5 MILLIGRAM(S): at 00:17

## 2024-12-25 NOTE — DISCHARGE NOTE NURSING/CASE MANAGEMENT/SOCIAL WORK - NSDCPEFALRISK_GEN_ALL_CORE
For information on Fall & Injury Prevention, visit: [email or web address] OR  [email or web address] OR  https://www.cdc.gov/steadi/patient.html

## 2024-12-25 NOTE — DISCHARGE NOTE NURSING/CASE MANAGEMENT/SOCIAL WORK - FINANCIAL ASSISTANCE
[facility] provides services at a reduced cost to those who are determined to be eligible through [facility]’s financial assistance program. Information regarding [facility]’s financial assistance program can be found by going to [email or web address] or by calling [phone].

## 2024-12-31 DIAGNOSIS — J45.909 UNSPECIFIED ASTHMA, UNCOMPLICATED: ICD-10-CM

## 2024-12-31 DIAGNOSIS — E03.9 HYPOTHYROIDISM, UNSPECIFIED: ICD-10-CM

## 2024-12-31 DIAGNOSIS — E66.01 MORBID (SEVERE) OBESITY DUE TO EXCESS CALORIES: ICD-10-CM

## 2024-12-31 DIAGNOSIS — M19.90 UNSPECIFIED OSTEOARTHRITIS, UNSPECIFIED SITE: ICD-10-CM

## 2024-12-31 DIAGNOSIS — E78.5 HYPERLIPIDEMIA, UNSPECIFIED: ICD-10-CM

## 2024-12-31 DIAGNOSIS — I11.0 HYPERTENSIVE HEART DISEASE WITH HEART FAILURE: ICD-10-CM

## 2024-12-31 DIAGNOSIS — I48.91 UNSPECIFIED ATRIAL FIBRILLATION: ICD-10-CM

## 2024-12-31 DIAGNOSIS — I25.10 ATHEROSCLEROTIC HEART DISEASE OF NATIVE CORONARY ARTERY WITHOUT ANGINA PECTORIS: ICD-10-CM

## 2024-12-31 DIAGNOSIS — G25.81 RESTLESS LEGS SYNDROME: ICD-10-CM

## 2024-12-31 DIAGNOSIS — I50.21 ACUTE SYSTOLIC (CONGESTIVE) HEART FAILURE: ICD-10-CM

## 2024-12-31 DIAGNOSIS — R94.6 ABNORMAL RESULTS OF THYROID FUNCTION STUDIES: ICD-10-CM

## 2024-12-31 DIAGNOSIS — I21.A1 MYOCARDIAL INFARCTION TYPE 2: ICD-10-CM

## 2024-12-31 DIAGNOSIS — N17.9 ACUTE KIDNEY FAILURE, UNSPECIFIED: ICD-10-CM

## 2025-03-17 NOTE — ED ADULT TRIAGE NOTE - MEANS OF ARRIVAL
This is a chronic problem.  Patient presently taking diltiazem.  Patient is requesting Rx refill  Tolerating medication well.  Denies chest pain shortness of breath palpitation or near syncope.   stretcher